# Patient Record
Sex: MALE | Race: WHITE | Employment: OTHER | URBAN - NONMETROPOLITAN AREA
[De-identification: names, ages, dates, MRNs, and addresses within clinical notes are randomized per-mention and may not be internally consistent; named-entity substitution may affect disease eponyms.]

---

## 2018-01-07 ENCOUNTER — HOSPITAL ENCOUNTER (EMERGENCY)
Age: 58
Discharge: HOME | End: 2018-01-07
Attending: FAMILY MEDICINE
Payer: MEDICARE

## 2018-01-07 ENCOUNTER — APPOINTMENT (OUTPATIENT)
Dept: GENERAL RADIOLOGY | Age: 58
End: 2018-01-07
Payer: MEDICARE

## 2018-01-07 VITALS
RESPIRATION RATE: 17 BRPM | SYSTOLIC BLOOD PRESSURE: 120 MMHG | OXYGEN SATURATION: 97 % | DIASTOLIC BLOOD PRESSURE: 81 MMHG | HEIGHT: 74 IN | HEART RATE: 71 BPM | TEMPERATURE: 97.6 F | WEIGHT: 245 LBS | BODY MASS INDEX: 31.44 KG/M2

## 2018-01-07 DIAGNOSIS — J10.1 INFLUENZA A: Primary | ICD-10-CM

## 2018-01-07 DIAGNOSIS — E87.6 HYPOKALEMIA: ICD-10-CM

## 2018-01-07 LAB
ANION GAP: 9 MEQ/L (ref 8–16)
BASOPHILS # BLD: 0.6 % (ref 0–3)
BUN BLDV-MCNC: 22 MG/DL (ref 7–18)
CHLORIDE BLD-SCNC: 101 MEQ/L (ref 98–107)
CO2: 29 MEQ/L (ref 21–32)
CREAT SERPL-MCNC: 1 MG/DL (ref 0.8–1.3)
EOSINOPHILS RELATIVE PERCENT: 2.1 % (ref 0–4)
FLU A ANTIGEN: POSITIVE
FLU B ANTIGEN: NEGATIVE
GFR, ESTIMATED: 81 ML/MIN/1.73M2
GLUCOSE BLD-MCNC: 129 MG/DL (ref 74–106)
HCT VFR BLD CALC: 43.6 % (ref 42–52)
HEMOGLOBIN: 14.7 GM/DL (ref 14–18)
LYMPHOCYTES # BLD: 25.3 % (ref 15–47)
MCH RBC QN AUTO: 27.9 PG (ref 27–31)
MCHC RBC AUTO-ENTMCNC: 33.8 GM/DL (ref 33–37)
MCV RBC AUTO: 82.5 FL (ref 80–94)
MONOCYTES: 11.9 % (ref 0–12)
PDW BLD-RTO: 12.4 % (ref 11.5–14.5)
PLATELET # BLD: 153 THOU/MM3 (ref 130–400)
PMV BLD AUTO: 8.7 MCM (ref 7.4–10.4)
POC CALCIUM: 8.7 MG/DL (ref 8.5–10.1)
POTASSIUM SERPL-SCNC: 3.3 MEQ/L (ref 3.5–5.1)
RBC # BLD: 5.29 MILL/MM3 (ref 4.7–6.1)
SEGS: 60.1 % (ref 43–75)
SODIUM BLD-SCNC: 139 MEQ/L (ref 136–145)
WBC # BLD: 4.8 THOU/MM3 (ref 4.8–10.8)

## 2018-01-07 PROCEDURE — 36415 COLL VENOUS BLD VENIPUNCTURE: CPT

## 2018-01-07 PROCEDURE — 71046 X-RAY EXAM CHEST 2 VIEWS: CPT

## 2018-01-07 PROCEDURE — 87804 INFLUENZA ASSAY W/OPTIC: CPT

## 2018-01-07 PROCEDURE — 80048 BASIC METABOLIC PNL TOTAL CA: CPT

## 2018-01-07 PROCEDURE — 94640 AIRWAY INHALATION TREATMENT: CPT

## 2018-01-07 PROCEDURE — 6370000000 HC RX 637 (ALT 250 FOR IP): Performed by: FAMILY MEDICINE

## 2018-01-07 PROCEDURE — 99283 EMERGENCY DEPT VISIT LOW MDM: CPT

## 2018-01-07 PROCEDURE — 85025 COMPLETE CBC W/AUTO DIFF WBC: CPT

## 2018-01-07 RX ORDER — IPRATROPIUM BROMIDE AND ALBUTEROL SULFATE 2.5; .5 MG/3ML; MG/3ML
1 SOLUTION RESPIRATORY (INHALATION) ONCE
Status: COMPLETED | OUTPATIENT
Start: 2018-01-07 | End: 2018-01-07

## 2018-01-07 RX ORDER — PROMETHAZINE HYDROCHLORIDE AND CODEINE PHOSPHATE 6.25; 1 MG/5ML; MG/5ML
5 SYRUP ORAL 4 TIMES DAILY PRN
Qty: 80 ML | Refills: 0 | Status: SHIPPED | OUTPATIENT
Start: 2018-01-07 | End: 2018-01-14

## 2018-01-07 RX ORDER — POTASSIUM CHLORIDE 750 MG/1
40 TABLET, FILM COATED, EXTENDED RELEASE ORAL ONCE
Status: COMPLETED | OUTPATIENT
Start: 2018-01-07 | End: 2018-01-07

## 2018-01-07 RX ORDER — OSELTAMIVIR PHOSPHATE 75 MG/1
75 CAPSULE ORAL 2 TIMES DAILY
Qty: 10 CAPSULE | Refills: 0 | Status: SHIPPED | OUTPATIENT
Start: 2018-01-07 | End: 2018-01-12

## 2018-01-07 RX ADMIN — IPRATROPIUM BROMIDE AND ALBUTEROL SULFATE 1 AMPULE: .5; 3 SOLUTION RESPIRATORY (INHALATION) at 08:45

## 2018-01-07 RX ADMIN — POTASSIUM CHLORIDE 40 MEQ: 750 TABLET, FILM COATED, EXTENDED RELEASE ORAL at 09:15

## 2018-01-07 ASSESSMENT — PAIN DESCRIPTION - PAIN TYPE: TYPE: ACUTE PAIN

## 2018-01-07 ASSESSMENT — ENCOUNTER SYMPTOMS
COUGH: 1
EYE PAIN: 0
EYE DISCHARGE: 0
RHINORRHEA: 1
SORE THROAT: 0
DIARRHEA: 0
NAUSEA: 0
BACK PAIN: 0
ABDOMINAL PAIN: 0
EYE REDNESS: 0
VOMITING: 0
SHORTNESS OF BREATH: 0
WHEEZING: 0

## 2018-01-07 ASSESSMENT — PAIN DESCRIPTION - LOCATION: LOCATION: BACK

## 2018-01-07 ASSESSMENT — PAIN DESCRIPTION - DESCRIPTORS: DESCRIPTORS: ACHING

## 2018-01-07 ASSESSMENT — PAIN SCALES - GENERAL: PAINLEVEL_OUTOF10: 6

## 2018-01-07 ASSESSMENT — PAIN DESCRIPTION - ORIENTATION: ORIENTATION: MID

## 2018-01-07 NOTE — ED NOTES
Patient resting in bed at this time. Xray complete and labs drawn for sample. Patient breathing Tx started. Patient updated on POC and educated on results including testing positive for flu. VS as noted. No complaints voiced.      Yuri Bowers RN  01/07/18 9307

## 2018-01-07 NOTE — ED PROVIDER NOTES
[facility]  eMERGENCY dEPARTMENT eNCOUnter          CHIEF COMPLAINT       Chief Complaint   Patient presents with    Cough       Nurses Notes reviewed and I agree except as noted in the HPI. HISTORY OF PRESENT ILLNESS    Sonia Matthews is a 59 y.o. male who presents Chief complaint of cough. Symptoms are ongoing for 2 days. nonProductive. No fever or chills. Sick contact niece with similar symptoms. REVIEW OF SYSTEMS     Review of Systems   Constitutional: Negative for chills, fatigue and fever. HENT: Positive for rhinorrhea. Negative for ear pain and sore throat. Eyes: Negative for pain, discharge, redness and visual disturbance. Respiratory: Positive for cough. Negative for shortness of breath and wheezing. Cardiovascular: Negative for chest pain, palpitations and leg swelling. Gastrointestinal: Negative for abdominal pain, diarrhea, nausea and vomiting. Genitourinary: Negative for difficulty urinating and dysuria. Musculoskeletal: Negative for arthralgias, back pain and myalgias. Skin: Negative for rash. Allergic/Immunologic: Negative for environmental allergies. Neurological: Negative for dizziness, seizures, syncope and headaches. Hematological: Negative for adenopathy. Psychiatric/Behavioral: Negative for suicidal ideas. The patient is not nervous/anxious. PAST MEDICAL HISTORY    has a past medical history of GERD (gastroesophageal reflux disease); Hyperlipidemia; Hypertension; and Parkinson's disease (tremor, stiffness, slow motion, unstable posture) ([address] SURGICAL HISTORY      has a past surgical history that includes Cardiac catheterization.     CURRENT MEDICATIONS       Previous Medications    ATORVASTATIN (LIPITOR) 10 MG TABLET    Take 10 mg by mouth daily    CARBIDOPA-LEVODOPA (SINEMET)  MG PER TABLET    Take 2 tablets by mouth 3 times daily    CEFPROZIL (CEFZIL) 250 MG TABLET    Take 250 mg by mouth 2 times daily    METOPROLOL

## 2018-01-07 NOTE — ED NOTES
Patient presents to the ED with complaints of cough for the past 2 days. Patient states that his cough has been so persistent and severe that he has developed mid back pain. Patient states that he has not felt feverish and states that his cough is dry. Patient states periodically he feels SOB. Patient is alert and oriented, respirations are even and unlabored. VS as noted.  Will continue to monitor     Celeste Ball RN  01/07/18 [id number]

## 2022-01-15 PROBLEM — K21.9 GASTROESOPHAGEAL REFLUX DISEASE: Status: ACTIVE | Noted: 2022-01-15

## 2022-01-15 PROBLEM — G20.A1 PARKINSON DISEASE (HCC): Status: ACTIVE | Noted: 2022-01-15

## 2022-01-15 PROBLEM — E78.2 MIXED HYPERLIPIDEMIA: Status: ACTIVE | Noted: 2022-01-15

## 2022-01-15 PROBLEM — I10 PRIMARY HYPERTENSION: Status: ACTIVE | Noted: 2022-01-15

## 2022-01-15 PROBLEM — G20 PARKINSON DISEASE (HCC): Status: ACTIVE | Noted: 2022-01-15

## 2022-12-28 ENCOUNTER — HOSPITAL ENCOUNTER (OUTPATIENT)
Age: 62
Discharge: HOME | End: 2022-12-28
Payer: MEDICARE

## 2022-12-28 LAB
EKG ATRIAL RATE: 88 BPM
EKG P AXIS: 85 DEGREES
EKG Q-T INTERVAL: 542 MS
EKG QRS DURATION: 180 MS
EKG QTC CALCULATION (BAZETT): 523 MS
EKG R AXIS: -48 DEGREES
EKG T AXIS: 57 DEGREES
EKG VENTRICULAR RATE: 56 BPM

## 2022-12-28 PROCEDURE — 93005 ELECTROCARDIOGRAM TRACING: CPT | Performed by: SPECIALIST

## 2023-01-25 ENCOUNTER — TELEPHONE (OUTPATIENT)
Dept: CARDIOLOGY CLINIC | Age: 63
End: 2023-01-25

## 2023-01-25 NOTE — TELEPHONE ENCOUNTER
Demond Davis called to rs his NP appt with [facility] for Gustavo 01-26. I canceled the appt just need to call him to get a new appt scheduled.

## 2023-02-03 ENCOUNTER — OFFICE VISIT (OUTPATIENT)
Dept: CARDIOLOGY CLINIC | Age: 63
End: 2023-02-03
Payer: MEDICARE

## 2023-02-03 VITALS
HEIGHT: 74 IN | BODY MASS INDEX: 30.36 KG/M2 | SYSTOLIC BLOOD PRESSURE: 131 MMHG | HEART RATE: 69 BPM | DIASTOLIC BLOOD PRESSURE: 86 MMHG | WEIGHT: 236.6 LBS

## 2023-02-03 DIAGNOSIS — I10 PRIMARY HYPERTENSION: Primary | ICD-10-CM

## 2023-02-03 DIAGNOSIS — R94.31 ABNORMAL ECG: ICD-10-CM

## 2023-02-03 DIAGNOSIS — E78.01 FAMILIAL HYPERCHOLESTEROLEMIA: ICD-10-CM

## 2023-02-03 PROCEDURE — G8484 FLU IMMUNIZE NO ADMIN: HCPCS | Performed by: NUCLEAR MEDICINE

## 2023-02-03 PROCEDURE — G8417 CALC BMI ABV UP PARAM F/U: HCPCS | Performed by: NUCLEAR MEDICINE

## 2023-02-03 PROCEDURE — 3075F SYST BP GE 130 - 139MM HG: CPT | Performed by: NUCLEAR MEDICINE

## 2023-02-03 PROCEDURE — G8427 DOCREV CUR MEDS BY ELIG CLIN: HCPCS | Performed by: NUCLEAR MEDICINE

## 2023-02-03 PROCEDURE — 99204 OFFICE O/P NEW MOD 45 MIN: CPT | Performed by: NUCLEAR MEDICINE

## 2023-02-03 PROCEDURE — 1036F TOBACCO NON-USER: CPT | Performed by: NUCLEAR MEDICINE

## 2023-02-03 PROCEDURE — 3017F COLORECTAL CA SCREEN DOC REV: CPT | Performed by: NUCLEAR MEDICINE

## 2023-02-03 PROCEDURE — 3079F DIAST BP 80-89 MM HG: CPT | Performed by: NUCLEAR MEDICINE

## 2023-02-03 ASSESSMENT — ENCOUNTER SYMPTOMS
NAUSEA: 0
ABDOMINAL PAIN: 0
ANAL BLEEDING: 0
PHOTOPHOBIA: 0
DIARRHEA: 0
CONSTIPATION: 0
RECTAL PAIN: 0
COLOR CHANGE: 0
BACK PAIN: 1
BLOOD IN STOOL: 0
SHORTNESS OF BREATH: 0
CHEST TIGHTNESS: 0
ABDOMINAL DISTENTION: 0

## 2023-02-03 NOTE — PROGRESS NOTES
[address] [facility] .  [address] [place] [id number]  Dept: [phone]  Dept Fax: [phone]  Loc: [phone]    Visit Date: 2/3/2023    Freddie Velazquez is a 57 y.o. male who presents todayfor:  Chief Complaint   Patient presents with    Cardiac Clearance    New Patient    Hyperlipidemia    Hypertension   Here for the first time  Known parkinson's disease  Limited by that   Going for skin cancer surgery   Found to have abnormal ECG   No chest pain   No changes in breathing  Not active due to the above  No syncope  Some dizziness  No know CAD  Known BP and hyperlipidemia  On medical Rx  No smoking   Family history of CAD        HPI:  Hyperlipidemia  Pertinent negatives include no chest pain, myalgias or shortness of breath. Hypertension  Pertinent negatives include no chest pain, neck pain or shortness of breath.    Past Medical History:   Diagnosis Date    GERD (gastroesophageal reflux disease)     Hyperlipidemia     Hypertension     Parkinson's disease (tremor, stiffness, slow motion, unstable posture) (HCC)       Past Surgical History:   Procedure Laterality Date    Gavino Garay  2017    for Parkinson's     Family History   Problem Relation Age of Onset    Diabetes Mother     Malig Hypertherm Neg Hx     Cancer Neg Hx     Heart Disease Neg Hx     High Blood Pressure Neg Hx     Stroke Neg Hx      Social History     Tobacco Use    Smoking status: Never    Smokeless tobacco: Never   Substance Use Topics    Alcohol use: No      Current Outpatient Medications   Medication Sig Dispense Refill    zoster recombinant adjuvanted vaccine (SHINGRIX) 50 MCG/0.5ML SUSR injection Inject 0.5 mLs into the muscle See Admin Instructions 1 dose now and repeat in 2-6 months 0.5 mL 0    losartan-hydroCHLOROthiazide (HYZAAR) 100-25 MG per tablet Take 1 tablet by mouth daily 90 tablet 0 pantoprazole (PROTONIX) 40 MG tablet Take 1 tablet by mouth daily 90 tablet 0    metoprolol succinate (TOPROL XL) 50 MG extended release tablet Take 1 tablet by mouth daily 90 tablet 0    atorvastatin (LIPITOR) 10 MG tablet Take 1 tablet by mouth daily 90 tablet 0    carbidopa-levodopa (SINEMET)  MG per tablet Take 1 tablet by mouth 3 times daily 270 tablet 0    sertraline (ZOLOFT) 50 MG tablet Take 1 tablet by mouth daily 90 tablet 0     No current facility-administered medications for this visit. Allergies   Allergen Reactions    Biaxin [Clarithromycin]      Not sure of reaction     Health Maintenance   Topic Date Due    Shingles vaccine (2 of 3) 04/05/2016    COVID-19 Vaccine (4 - Booster for Pfizer series) 01/31/2022    DTaP/Tdap/Td vaccine (2 - Td or Tdap) 02/06/2022    Lipids  02/07/2023    Flu vaccine (1) 10/03/2023 (Originally 8/1/2022)    Diabetes screen  01/20/2024 (Originally 9/21/1993)    Depression Monitoring  01/24/2024    Annual Wellness Visit (AWV)  01/25/2024    Colorectal Cancer Screen  07/30/2031    Hepatitis C screen  Completed    Hepatitis A vaccine  Aged Out    Hib vaccine  Aged Out    Meningococcal (ACWY) vaccine  Aged Out    Pneumococcal 0-64 years Vaccine  Aged Out    HIV screen  Discontinued       Subjective:  Review of Systems   Constitutional:  Positive for fatigue. Negative for diaphoresis. HENT:  Negative for drooling and hearing loss. Eyes:  Negative for photophobia. Respiratory:  Negative for chest tightness and shortness of breath. Cardiovascular:  Negative for chest pain. Gastrointestinal:  Negative for abdominal distention, abdominal pain, anal bleeding, blood in stool, constipation, diarrhea, nausea and rectal pain. Endocrine: Negative for polydipsia. Genitourinary:  Negative for dysuria, frequency and urgency. Musculoskeletal:  Positive for arthralgias and back pain. Negative for gait problem, joint swelling, myalgias, neck pain and neck stiffness. Skin:  Negative for color change, pallor, rash and wound. Allergic/Immunologic: Negative for food allergies. Neurological:  Negative for dizziness, syncope and light-headedness. Psychiatric/Behavioral:  Negative for behavioral problems, confusion, decreased concentration and dysphoric mood. Objective:  Physical Exam  HENT:      Head: Normocephalic. Right Ear: Tympanic membrane normal.      Nose: Nose normal.      Mouth/Throat:      Mouth: Mucous membranes are moist.   Eyes:      Pupils: Pupils are equal, round, and reactive to light. Cardiovascular:      Rate and Rhythm: Normal rate and regular rhythm. Heart sounds: Murmur heard. No gallop. Pulmonary:      Effort: No respiratory distress. Breath sounds: No stridor. No wheezing, rhonchi or rales. Chest:      Chest wall: No tenderness. Abdominal:      General: There is no distension. Palpations: There is no mass. Tenderness: There is no abdominal tenderness. There is no right CVA tenderness, left CVA tenderness, guarding or rebound. Hernia: No hernia is present. Musculoskeletal:         General: No swelling, tenderness, deformity or signs of injury. Cervical back: Normal range of motion. Right lower leg: No edema. Left lower leg: No edema. Skin:     Coloration: Skin is not jaundiced or pale. Findings: No bruising, erythema, lesion or rash. Neurological:      Mental Status: He is alert and oriented to person, place, and time. Cranial Nerves: No cranial nerve deficit. Sensory: No sensory deficit. Motor: No weakness. Coordination: Coordination normal.      Gait: Gait normal.      Deep Tendon Reflexes: Reflexes normal.   Psychiatric:         Mood and Affect: Mood normal.         Thought Content: Thought content normal.     /86   Pulse 69   Ht 6' 1.5\" (1.867 m)   Wt 236 lb 9.6 oz (107.3 kg)   BMI 30.79 kg/m²     Assessment:      Diagnosis Orders   1.  Primary hypertension        2. Familial hypercholesterolemia        3. Abnormal ECG        Likely abnormal ECG due to baseline artifact   No obvious findings  No obvious symptoms   Lower risk for CAD     Plan:  No follow-ups on file. As above  Clear for surgery   No clear indication for a stress test   Continue risk factor modification and medical management  Thank you for allowing me to participate in the care of your patient. Please don't hesitate to contact me regarding any further issues related to the patient care    Orders Placed:  No orders of the defined types were placed in this encounter. Medications Prescribed:  No orders of the defined types were placed in this encounter. Discussed use, benefit, and side effects of prescribed medications. All patient questions answered. Pt voicedunderstanding. Instructed to continue current medications, diet and exercise. Continue risk factor modification and medical management. Patient agreed with treatment plan. Follow up as directed.     Electronically signedby Mak Hitchcock MD on 2/3/2023 at 9:49 AM

## 2023-02-11 ENCOUNTER — APPOINTMENT (OUTPATIENT)
Dept: GENERAL RADIOLOGY | Age: 63
End: 2023-02-11
Payer: MEDICARE

## 2023-02-11 ENCOUNTER — HOSPITAL ENCOUNTER (EMERGENCY)
Age: 63
Discharge: HOME | End: 2023-02-11
Attending: EMERGENCY MEDICINE
Payer: MEDICARE

## 2023-02-11 ENCOUNTER — APPOINTMENT (OUTPATIENT)
Dept: CT IMAGING | Age: 63
End: 2023-02-11
Payer: MEDICARE

## 2023-02-11 VITALS
BODY MASS INDEX: 30.16 KG/M2 | OXYGEN SATURATION: 94 % | SYSTOLIC BLOOD PRESSURE: 128 MMHG | DIASTOLIC BLOOD PRESSURE: 84 MMHG | HEART RATE: 66 BPM | TEMPERATURE: 97.3 F | HEIGHT: 74 IN | RESPIRATION RATE: 18 BRPM | WEIGHT: 235 LBS

## 2023-02-11 DIAGNOSIS — S09.90XA CLOSED HEAD INJURY, INITIAL ENCOUNTER: ICD-10-CM

## 2023-02-11 DIAGNOSIS — T14.8XXA ABRASION: Primary | ICD-10-CM

## 2023-02-11 PROCEDURE — 70450 CT HEAD/BRAIN W/O DYE: CPT

## 2023-02-11 PROCEDURE — 72125 CT NECK SPINE W/O DYE: CPT

## 2023-02-11 PROCEDURE — 90471 IMMUNIZATION ADMIN: CPT | Performed by: STUDENT IN AN ORGANIZED HEALTH CARE EDUCATION/TRAINING PROGRAM

## 2023-02-11 PROCEDURE — 99284 EMERGENCY DEPT VISIT MOD MDM: CPT | Performed by: EMERGENCY MEDICINE

## 2023-02-11 PROCEDURE — 71045 X-RAY EXAM CHEST 1 VIEW: CPT

## 2023-02-11 PROCEDURE — 72170 X-RAY EXAM OF PELVIS: CPT

## 2023-02-11 PROCEDURE — 12002 RPR S/N/AX/GEN/TRNK2.6-7.5CM: CPT

## 2023-02-11 PROCEDURE — 6360000002 HC RX W HCPCS: Performed by: STUDENT IN AN ORGANIZED HEALTH CARE EDUCATION/TRAINING PROGRAM

## 2023-02-11 PROCEDURE — 90715 TDAP VACCINE 7 YRS/> IM: CPT | Performed by: STUDENT IN AN ORGANIZED HEALTH CARE EDUCATION/TRAINING PROGRAM

## 2023-02-11 RX ADMIN — TETANUS TOXOID, REDUCED DIPHTHERIA TOXOID AND ACELLULAR PERTUSSIS VACCINE, ADSORBED 0.5 ML: 5; 2.5; 8; 8; 2.5 SUSPENSION INTRAMUSCULAR at 20:30

## 2023-02-11 ASSESSMENT — PAIN DESCRIPTION - ORIENTATION: ORIENTATION: POSTERIOR

## 2023-02-11 ASSESSMENT — PAIN - FUNCTIONAL ASSESSMENT: PAIN_FUNCTIONAL_ASSESSMENT: 0-10

## 2023-02-11 ASSESSMENT — PAIN SCALES - GENERAL: PAINLEVEL_OUTOF10: 4

## 2023-02-11 ASSESSMENT — PAIN DESCRIPTION - LOCATION: LOCATION: HEAD

## 2023-02-12 NOTE — ED TRIAGE NOTES
Patient to ED c/o fall. Patient was at basketball game at [facility] when he tripped and lost his balance and fell. Patient hit back of head, with small abrasion. Patient is not on blood thinners. Rates pain 4/10.

## 2023-02-12 NOTE — DISCHARGE INSTRUCTIONS
Take your medications as prescribed follow-up with your primary care physician on Monday as needed for further evaluation. Return to the emergency department if you develop any new or worsening symptoms. For wound care you may apply bacitracin twice a day to your abrasion.

## 2023-02-12 NOTE — ED PROVIDER NOTES
[facility] [address] [address] EMERGENCY DEPT      EMERGENCY MEDICINE     Pt Name: Charley Franco  MRN: [id number]  [place] 1960  Date of evaluation: 2/11/2023  Provider: Elizabeth Silva MD  Supervising Physician: Dr Lisa Harvey     No chief complaint on file. HISTORY OF PRESENT ILLNESS   Charley Franco is a pleasant 57 y.o. male past medical history of hypertension, hyperlipidemia, Parkinson's disease with deep brain stimulator who presents to the emergency department for fall. Tripped over a phone number his leg while trying to twist and turn around and falling backwards hitting his head no LOC. Small abrasion hematoma was brought in by EMS. No neck pain. No other injuries at this time.   He is on no anticoagulant medication no antiplatelet agents          PASTMEDICAL HISTORY     Past Medical History:   Diagnosis Date    GERD (gastroesophageal reflux disease)     Hyperlipidemia     Hypertension     Parkinson's disease (tremor, stiffness, slow motion, unstable posture) ([facility])        Patient Active Problem List   Diagnosis Code    Mixed hyperlipidemia E78.2    Gastroesophageal reflux disease K21.9    Parkinson disease ([facility] G20    Primary hypertension I10     SURGICAL HISTORY       Past Surgical History:   Procedure Laterality Date    CARDIAC CATHETERIZATION      Dr. Mike Morgan  2017    for Parkinson's       CURRENT MEDICATIONS       Previous Medications    ATORVASTATIN (LIPITOR) 10 MG TABLET    Take 1 tablet by mouth daily    CARBIDOPA-LEVODOPA (SINEMET)  MG PER TABLET    Take 1 tablet by mouth 3 times daily    LOSARTAN-HYDROCHLOROTHIAZIDE (HYZAAR) 100-25 MG PER TABLET    Take 1 tablet by mouth daily    METOPROLOL SUCCINATE (TOPROL XL) 50 MG EXTENDED RELEASE TABLET    Take 1 tablet by mouth daily    PANTOPRAZOLE (PROTONIX) 40 MG TABLET    Take 1 tablet by mouth daily    SERTRALINE (ZOLOFT) 50 MG TABLET    Take 1 tablet by mouth daily    ZOSTER RECOMBINANT ADJUVANTED VACCINE (SHINGRIX) 50 MCG/0.5ML SUSR INJECTION    Inject 0.5 mLs into the muscle See Admin Instructions 1 dose now and repeat in 2-6 months       ALLERGIES     is allergic to biaxin [clarithromycin]. FAMILY HISTORY     He indicated that the status of his mother is unknown. He indicated that the status of his neg hx is unknown. SOCIAL HISTORY       Social History     Tobacco Use    Smoking status: Never    Smokeless tobacco: Never   Vaping Use    Vaping Use: Never used   Substance Use Topics    Alcohol use: No    Drug use: No       PHYSICAL EXAM       ED Triage Vitals [02/11/23 1957]   BP Temp Temp Source Heart Rate Resp SpO2 Height Weight   128/84 97.3 °F (36.3 °C) Oral 66 18 94 % 6' 1.5\" (1.867 m) 235 lb (106.6 kg)       Physical Exam  Vitals and nursing note reviewed. Constitutional:       General: He is not in acute distress. Appearance: He is not toxic-appearing or diaphoretic. HENT:      Head:      Comments: Abrasion hematoma to the posterior occiput. No active bleeding. Right Ear: External ear normal.      Left Ear: External ear normal.      Nose: Nose normal.      Mouth/Throat:      Mouth: Mucous membranes are moist.      Pharynx: Oropharynx is clear. Eyes:      General: No scleral icterus. Conjunctiva/sclera: Conjunctivae normal.      Pupils: Pupils are equal, round, and reactive to light. Cardiovascular:      Rate and Rhythm: Normal rate and regular rhythm. Pulses: Normal pulses. Heart sounds: Normal heart sounds. Comments: Radial pulses 2+ bilaterally, DP pulses 2+ bilaterally. Pulmonary:      Effort: Pulmonary effort is normal. No respiratory distress. Breath sounds: Normal breath sounds. Abdominal:      General: Abdomen is flat. There is no distension. Palpations: Abdomen is soft. Tenderness: There is no abdominal tenderness. There is no guarding or rebound. Musculoskeletal:         General: Normal range of motion.       Cervical back: Normal range of motion and neck supple. No rigidity. No muscular tenderness. Lymphadenopathy:      Cervical: No cervical adenopathy. Skin:     General: Skin is warm and dry. Capillary Refill: Capillary refill takes less than 2 seconds. Coloration: Skin is not jaundiced. Neurological:      Mental Status: He is alert and oriented to person, place, and time. GCS: GCS eye subscore is 4. GCS verbal subscore is 5. GCS motor subscore is 6. Psychiatric:         Mood and Affect: Mood normal.         Behavior: Behavior normal.         FORMAL DIAGNOSTIC RESULTS     RADIOLOGY: Interpretation per the Radiologist below, if available at the time of this note (none if blank):    CT Head W/O Contrast   Final Result   Impression:       Chronic involutional volume loss with no signs of acute trauma. There are small bilateral maxillary sinus fluid levels. This document has been electronically signed by: Lv Ochoa MD on    02/11/2023 08:57 PM      All CTs at this facility use dose modulation techniques and iterative    reconstructions, and/or weight-based dosing   when appropriate to reduce radiation to a low as reasonably achievable. CT CERVICAL SPINE WO CONTRAST   Final Result   Impression:   Degenerative changes with no signs of acute trauma. No epidural hematoma. This document has been electronically signed by: Lv Ochoa MD on    02/11/2023 08:53 PM      All CTs at this facility use dose modulation techniques and iterative    reconstructions, and/or weight-based dosing   when appropriate to reduce radiation to a low as reasonably achievable. XR PELVIS (1-2 VIEWS)   Final Result      No pelvic fracture. Final report electronically signed by Dr. Farzad Krishna on 2/11/2023 8:24 PM      XR CHEST 1 VIEW   Final Result      No acute intrathoracic process.       Final report electronically signed by Dr. Farzad Krishna on 2/11/2023 8:25 PM          LABS: (none if blank)  Labs Reviewed - No data to display    (Any cultures that may have been sent were not resulted at the time of this patient visit)    [address] / ED COURSE:     External Documentation Reviewed:         Previous patient encounter documents & history available on EMR was reviewed: Patient was seen in office on 2/3/2023. Primary hypertension      1)           Differential Diagnosis includes (but not limited to): Intracranial hemorrhage, contusion, closed head injury        Diagnoses Considered but I have low suspicion of:   Abrasion, fracture       Decision Rules/Clinical Scores utilized:               See Formal Diagnostic Results above for the lab and radiology tests and orders. 3)  Treatment and Disposition         ED Reassessment:  See ED course         Case discussed with consulting clinician:           Shared Decision-Making was performed and disposition discussed with the        Patient/Family and questions answered          Social determinants of health impacting treatment or disposition:  none      Summary of Patient Presentation:      ED Course as of 02/11/23 2133   Sat Feb 11, 2023 2047 XR PELVIS (1-2 VIEWS)  \"   IMPRESSION:     No pelvic fracture. \" [AL]   2106 CT Head W/O Contrast  \"IMPRESSION:  Impression:      Chronic involutional volume loss with no signs of acute trauma. There are small bilateral maxillary sinus fluid levels. \" [AL]   2106 CT CERVICAL SPINE WO CONTRAST  \"   IMPRESSION:  Impression:  Degenerative changes with no signs of acute trauma. No epidural hematoma. \" [AL]      ED Course User Index  [AL] Stefano Rico MD         MDM:   This is a well-appearing this is a 55-year-old male had a trip and fall hitting the back of his head with no LOC. Has a history of Parkinson's with a deep brain stimulator. He was using a cane tripped over a family numbers foot and fell backwards landing on his head and it was mechanical in nature.   He has no new focal neurological deficits. GCS 15 alert and oriented x4. CT head shows no acute intracranial hemorrhage. CT cervical spine shows no fracture chest x-ray shows no pneumothorax. He is well-appearing he has abrasion on the occiput that has no active bleeding was updated on his tetanus. Vitals Reviewed:    Vitals:    02/11/23 1957   BP: 128/84   Pulse: 66   Resp: 18   Temp: 97.3 °F (36.3 °C)   TempSrc: Oral   SpO2: 94%   Weight: 235 lb (106.6 kg)   Height: 6' 1.5\" (1.867 m)       The patient was seen and examined. Appropriate diagnostic testing was performed and results reviewed with the patient. The results of pertinent diagnostic studies and exam findings were discussed. The patients provisional diagnosis and plan of care were discussed with the patient and present family who expressed understanding. Any medications were reviewed and indications and risks of medications were discussed with the patient /family present. Strict verbal and written return precautions, instructions and appropriate follow-up provided to  the patient .      ED Medications administered this visit:  (None if blank)  Medications   Tetanus-Diphth-Acell Pertussis (BOOSTRIX) injection 0.5 mL (0.5 mLs IntraMUSCular Given 2/11/23 2030)         PROCEDURES: (None if blank)  Lac Repair    Date/Time: 2/11/2023 9:32 PM  Performed by: Melonie Gonzales MD  Authorized by: Sarah Byrnes DO     Consent:     Consent obtained:  Verbal    Consent given by:  Patient    Risks, benefits, and alternatives were discussed: yes      Risks discussed:  Need for additional repair, nerve damage, infection, pain, poor cosmetic result, poor wound healing, retained foreign body, tendon damage and vascular damage  Universal protocol:     Patient identity confirmed:  Verbally with patient and arm band  Anesthesia:     Anesthesia method:  None  Laceration details:     Location:  Scalp    Length (cm):  4    Depth (mm):  3  Pre-procedure details:     Preparation:  Patient was prepped and draped in usual sterile fashion and imaging obtained to evaluate for foreign bodies  Exploration:     Limited defect created (wound extended): no      Hemostasis achieved with:  Direct pressure    Imaging obtained comment:  Ct    Wound extent: areolar tissue violated and fascia violated      Wound extent: no foreign bodies/material noted, no muscle damage noted, no tendon damage noted, no underlying fracture noted and no vascular damage noted      Contaminated: no    Treatment:     Area cleansed with:  Chlorhexidine    Amount of cleaning:  Extensive    Irrigation solution:  Sterile saline    Debridement:  None  Skin repair:     Repair method:  Staples    Number of staples:  2  Approximation:     Approximation:  Close  Repair type:     Repair type:  Simple  Post-procedure details:     Dressing:  Open (no dressing):     CRITICAL CARE: (None if blank)      DISCHARGE PRESCRIPTIONS: (None if blank)  New Prescriptions    No medications on file       FINAL IMPRESSION      1. Abrasion    2.  Closed head injury, initial encounter            DISPOSITION/PLAN   DISPOSITION Discharge - Pending Orders Complete 02/11/2023 09:16:18 PM      OUTPATIENT FOLLOW UP THE PATIENT:  Sidra Chaves MD  [address] [place] [address]  [phone]    Schedule an appointment as soon as possible for a visit in 2 days  As needed    MD Bam Marx MD  Resident  02/11/23 7327       Bam Eagle MD  Resident  02/11/23 0528

## 2023-03-06 NOTE — PROGRESS NOTES
Patient instructed not to eat or drink anything after midnight the day before surgery. Take heart & blood pressure medications in the morning with a small sip of water. Please bring list of medications with the dosages & when you take them. If you do not  have a list bring the medications bottles with you. If having a MAC or general anesthetic you MUST have a . Bring photo ID & insurance information. Leave jewelry (watch ,rings, peircings) & other valuables, including extra cash, at home. Wear comfortable clean clothing. When showering or bathing the night before & morning of surgery please use  antibacterial soap. Follow any instructions from Dr. Nguyen Organ office.

## 2023-03-06 NOTE — PROGRESS NOTES
EKG (12-), cardiac clearance (low/moderate risk) & patient history reviewed per Dr. Hills.  OK to proceed with surgery at [facility]- with Dr. Barnes under MAC.

## 2023-03-13 ENCOUNTER — ANESTHESIA EVENT (OUTPATIENT)
Dept: OPERATING ROOM | Age: 63
End: 2023-03-13
Payer: MEDICARE

## 2023-03-13 ENCOUNTER — HOSPITAL ENCOUNTER (OUTPATIENT)
Age: 63
Setting detail: OUTPATIENT SURGERY
Discharge: HOME | End: 2023-03-13
Attending: SPECIALIST | Admitting: SPECIALIST
Payer: MEDICARE

## 2023-03-13 ENCOUNTER — ANESTHESIA (OUTPATIENT)
Dept: OPERATING ROOM | Age: 63
End: 2023-03-13
Payer: MEDICARE

## 2023-03-13 VITALS
SYSTOLIC BLOOD PRESSURE: 120 MMHG | BODY MASS INDEX: 29.26 KG/M2 | WEIGHT: 228 LBS | RESPIRATION RATE: 16 BRPM | DIASTOLIC BLOOD PRESSURE: 56 MMHG | TEMPERATURE: 96.9 F | OXYGEN SATURATION: 95 % | HEIGHT: 74 IN | HEART RATE: 65 BPM

## 2023-03-13 DIAGNOSIS — C44.42 SCC (SQUAMOUS CELL CARCINOMA), SCALP/NECK: ICD-10-CM

## 2023-03-13 PROCEDURE — 3600000012 HC SURGERY LEVEL 2 ADDTL 15MIN: Performed by: SPECIALIST

## 2023-03-13 PROCEDURE — 7100000011 HC PHASE II RECOVERY - ADDTL 15 MIN: Performed by: SPECIALIST

## 2023-03-13 PROCEDURE — 88305 TISSUE EXAM BY PATHOLOGIST: CPT

## 2023-03-13 PROCEDURE — 2709999900 HC NON-CHARGEABLE SUPPLY: Performed by: SPECIALIST

## 2023-03-13 PROCEDURE — 6360000002 HC RX W HCPCS: Performed by: SPECIALIST

## 2023-03-13 PROCEDURE — 3700000000 HC ANESTHESIA ATTENDED CARE: Performed by: SPECIALIST

## 2023-03-13 PROCEDURE — 6360000002 HC RX W HCPCS: Performed by: NURSE ANESTHETIST, CERTIFIED REGISTERED

## 2023-03-13 PROCEDURE — 7100000010 HC PHASE II RECOVERY - FIRST 15 MIN: Performed by: SPECIALIST

## 2023-03-13 PROCEDURE — 3600000002 HC SURGERY LEVEL 2 BASE: Performed by: SPECIALIST

## 2023-03-13 PROCEDURE — 3700000001 HC ADD 15 MINUTES (ANESTHESIA): Performed by: SPECIALIST

## 2023-03-13 PROCEDURE — 2580000003 HC RX 258: Performed by: SPECIALIST

## 2023-03-13 PROCEDURE — 88331 PATH CONSLTJ SURG 1 BLK 1SPC: CPT

## 2023-03-13 PROCEDURE — 2500000003 HC RX 250 WO HCPCS: Performed by: SPECIALIST

## 2023-03-13 RX ORDER — PROPOFOL 10 MG/ML
INJECTION, EMULSION INTRAVENOUS PRN
Status: DISCONTINUED | OUTPATIENT
Start: 2023-03-13 | End: 2023-03-13 | Stop reason: SDUPTHER

## 2023-03-13 RX ORDER — SODIUM CHLORIDE 9 MG/ML
INJECTION, SOLUTION INTRAVENOUS CONTINUOUS
Status: DISCONTINUED | OUTPATIENT
Start: 2023-03-13 | End: 2023-03-13 | Stop reason: HOSPADM

## 2023-03-13 RX ORDER — LIDOCAINE HYDROCHLORIDE AND EPINEPHRINE BITARTRATE 20; .01 MG/ML; MG/ML
INJECTION, SOLUTION SUBCUTANEOUS PRN
Status: DISCONTINUED | OUTPATIENT
Start: 2023-03-13 | End: 2023-03-13 | Stop reason: ALTCHOICE

## 2023-03-13 RX ADMIN — Medication 2000 MG: at 09:31

## 2023-03-13 RX ADMIN — SODIUM CHLORIDE: 9 INJECTION, SOLUTION INTRAVENOUS at 09:24

## 2023-03-13 RX ADMIN — PROPOFOL 40 MG: 10 INJECTION, EMULSION INTRAVENOUS at 09:33

## 2023-03-13 RX ADMIN — PROPOFOL 40 MG: 10 INJECTION, EMULSION INTRAVENOUS at 09:58

## 2023-03-13 ASSESSMENT — PAIN SCALES - GENERAL
PAINLEVEL_OUTOF10: 0
PAINLEVEL_OUTOF10: 0

## 2023-03-13 NOTE — ANESTHESIA PRE PROCEDURE
Department of Anesthesiology  Preprocedure Note       Name:  Rosario Jefferson   Age:  57 y.o.  :  1960                                          MRN:  [id number]         Date:  3/13/2023      Surgeon: Cris Bello):  Farida Sands MD    Procedure: Procedure(s):  SCC RIGHT VERTEX OF SCALP    Medications prior to admission:   Prior to Admission medications    Medication Sig Start Date End Date Taking? Authorizing Provider   zoster recombinant adjuvanted vaccine [facility]) 50 MCG/0.5ML SUSR injection Inject 0.5 mLs into the muscle See Admin Instructions 1 dose now and repeat in 2-6 months 23  Vikki Corea MD   losartan-hydroCHLOROthiazide [facility]) 100-25 MG per tablet Take 1 tablet by mouth daily 23   Vikki Corea MD   pantoprazole (PROTONIX) 40 MG tablet Take 1 tablet by mouth daily 23   Vikki Corea MD   metoprolol succinate (TOPROL XL) 50 MG extended release tablet Take 1 tablet by mouth daily 23   Vikki Corea MD   atorvastatin (LIPITOR) 10 MG tablet Take 1 tablet by mouth daily 23   Vikki Corea MD   carbidopa-levodopa (SINEMET)  MG per tablet Take 1 tablet by mouth 3 times daily 23   Vikki Corea MD   sertraline (ZOLOFT) 50 MG tablet Take 1 tablet by mouth daily 23   Vikki Corea MD       Current medications:    Current Facility-Administered Medications   Medication Dose Route Frequency Provider Last Rate Last Admin    0.9 % sodium chloride infusion   IntraVENous Continuous Farida Sands MD        ceFAZolin (ANCEF) 2000 mg in 0.9% sodium chloride 50 mL IVPB  2,000 mg IntraVENous Once Farida Sands MD           Allergies:     Allergies   Allergen Reactions    Biaxin [Clarithromycin]      Not sure of reaction       Problem List:    Patient Active Problem List   Diagnosis Code    Mixed hyperlipidemia E78.2    Gastroesophageal reflux disease K21.9    Parkinson disease ([facility] [address] G20    Primary hypertension I10       Past Medical History: Diagnosis Date    Cancer ([facility]     skin    GERD (gastroesophageal reflux disease)     Hyperlipidemia     Hypertension     Parkinson's disease (tremor, stiffness, slow motion, unstable posture) ([facility]        Past Surgical History:        Procedure Laterality Date   Glenis Baxter   early 2000   no stents    COLONOSCOPY      last one 2021    DEEP BRAIN STIMULATOR PLACEMENT  2017    for Parkinson's    SKIN BIOPSY         Social History:    Social History     Tobacco Use    Smoking status: Never     Passive exposure: Never    Smokeless tobacco: Never   Substance Use Topics    Alcohol use: No                                Counseling given: Not Answered      Vital Signs (Current):   Vitals:    03/06/23 1437 03/13/23 0839   BP:  110/72   Pulse:  57   Resp:  16   Temp:  96.8 °F (36 °C)   TempSrc:  Temporal   SpO2:  95%   Weight: 230 lb (104.3 kg) 228 lb (103.4 kg)   Height: 6' 1.5\" (1.867 m) 6' 1.5\" (1.867 m)                                              BP Readings from Last 3 Encounters:   03/13/23 110/72   02/20/23 120/84   02/11/23 128/84       NPO Status: Time of last liquid consumption: 2130                        Time of last solid consumption: 2130                        Date of last liquid consumption: 03/12/23                        Date of last solid food consumption: 03/12/23    BMI:   Wt Readings from Last 3 Encounters:   03/13/23 228 lb (103.4 kg)   02/11/23 235 lb (106.6 kg)   02/03/23 236 lb 9.6 oz (107.3 kg)     Body mass index is 29.67 kg/m².     CBC:   Lab Results   Component Value Date/Time    WBC 7.0 12/28/2022 09:23 AM    RBC 4.85 12/28/2022 09:23 AM    RBC 5.31 02/07/2022 08:10 AM    HGB 13.4 12/28/2022 09:23 AM    HCT 40.2 12/28/2022 09:23 AM    MCV 82.9 12/28/2022 09:23 AM    RDW 15.0 02/07/2022 08:10 AM     12/28/2022 09:23 AM       CMP:   Lab Results   Component Value Date/Time     12/28/2022 09:23 AM    K 3.7 12/28/2022 09:23 AM     12/28/2022 09:23 AM    CO2 27 12/28/2022 09:23 AM    BUN 15 12/28/2022 09:23 AM    CREATININE 1.0 12/28/2022 09:23 AM    LABGLOM >60 12/28/2022 09:23 AM    GLUCOSE 110 12/28/2022 09:23 AM    GLUCOSE 100 02/07/2022 08:10 AM    PROT 8.1 02/07/2022 08:10 AM    CALCIUM 9.1 12/28/2022 09:23 AM    BILITOT 0.8 02/07/2022 08:10 AM    ALKPHOS 119 02/07/2022 08:10 AM    ALKPHOS 99 02/01/2017 06:46 PM    AST 24 02/07/2022 08:10 AM    ALT 29 02/07/2022 08:10 AM       POC Tests: No results for input(s): POCGLU, POCNA, POCK, POCCL, POCBUN, POCHEMO, POCHCT in the last 72 hours. Coags: No results found for: PROTIME, INR, APTT    HCG (If Applicable): No results found for: PREGTESTUR, PREGSERUM, HCG, HCGQUANT     ABGs: No results found for: PHART, PO2ART, OQI1ODU, FQB8LKI, BEART, A9LASFOU     Type & Screen (If Applicable):  No results found for: LABABO, LABRH    Drug/Infectious Status (If Applicable):  Lab Results   Component Value Date/Time    HEPCAB Non-Reactive 02/07/2022 08:10 AM       COVID-19 Screening (If Applicable): No results found for: COVID19        Anesthesia Evaluation   no history of anesthetic complications:   Airway: Mallampati: II  TM distance: >3 FB   Neck ROM: full  Mouth opening: > = 3 FB   Dental:          Pulmonary:normal exam              Patient did not smoke on day of surgery. Cardiovascular:    (+) hypertension:,                   Neuro/Psych:                ROS comment: Parkinson disease s/p DBS GI/Hepatic/Renal:   (+) GERD:,           Endo/Other: Negative Endo/Other ROS             Pt had no PAT visit       Abdominal:             Vascular: Other Findings:           Anesthesia Plan      MAC     ASA 3       Induction: intravenous. Anesthetic plan and risks discussed with patient. Plan discussed with CRNA.                     Saul Brizuela MD   3/13/2023

## 2023-03-13 NOTE — PROGRESS NOTES
1120 Discharge instructions given to pt and wife with each voicing understanding. IV discontinued.  Up to dress with wife assist  [phone] Discharge to home in stable condition per wheelchair with wife

## 2023-03-13 NOTE — DISCHARGE INSTRUCTIONS
Erendira Greene is a 53 y.o. female who presents with the following  Chief Complaint   Patient presents with    Follow-up       HPI    Follow-up for migraines  She has been on Aimovig x3 months and is doing well  70 mg the first of every month  So far in October only 1 migraine  She is used Nurtec 1 time and it worked really well  There is been no changes in her overall health for migraine quality or character or symptoms  She feels like things are working well  She does not think we need to change anything  She will be going to [facility] soon for work  Hopefully things stay stable there  We will continue to track and hopefully get better at the 3 to 6-month fady but overall is doing well    No Known Allergies    Current Outpatient Medications   Medication Sig    erenumab-aooe (Aimovig Autoinjector) 70 mg/mL injection 1 mL by SubCUTAneous route every thirty (30) days. PA Case: [id number], approved    rimegepant (Nurtec ODT) 75 mg disintegrating tablet Take 1 tablet by mouth daily PRN. Max 1 tablet in 24 hours. spironolactone (ALDACTONE) 50 mg tablet Take  by mouth daily. FLUoxetine (PROzac) 20 mg capsule Take 20 mg by mouth every morning.    multivitamin (ONE A DAY) tablet Take 1 Tab by mouth daily. No current facility-administered medications for this visit. Social History     Tobacco Use   Smoking Status Never   Smokeless Tobacco Never       Past Medical History:   Diagnosis Date    Anxiety     Psychiatric disorder     OCD       Past Surgical History:   Procedure Laterality Date    HX BUNIONECTOMY Right 2018    HX PELVIC LAPAROSCOPY  1995    ovarian cyst removed       Family History   Problem Relation Age of Onset    Atrial Fibrillation Mother     Prostate Cancer Father        Social History     Socioeconomic History    Marital status:    Tobacco Use    Smoking status: Never    Smokeless tobacco: Never   Vaping Use    Vaping Use: Never used   Substance and Sexual Activity    Alcohol use:  Yes Keep helmet dressing on until Weds morning. May shower after dressing removed. Activity:    No strenuous activity for 48 hours  No activity that stresses the suture closure/incision  Regular diet; Unless operation of lip- then clear liquids for 48 hours (Sip from a cup: do not use a straw)  ABSOLUTELY NO NICOTINE OF ANY TYPE    Wound Care: If the incision is open to air, you should gently wash with soap and water using fingertips then apply Bacitracin 3 times a day for 4 days    Keep all incisions clean  You may shower 48 hours after the operation, but do not use a washcloth to the area and let the incision air dry      Limitations:  No swimming, hot tub, sauna or soaking in a bathtub. Shower Only!!      Prescriptions: Take exactly as prescribed    Follow-Up:  Monday April 3 at 9:00 at Dr Kayla Cannon office. IF ANESTHESIA OR IV SEDATION    Do not drive for 24 hours and do not drive while taking narcotic pain medications. Notify our office if you experience any of the following:   Develop a fever (temperature is greater than 100.5F)   Develop redness greater than 3/4 inch around incision or red streaks traveling up extremity occur  Have any excess bleeding/ increased drainage or swelling odor and/or extreme heat to the touch at incision line       **Your pathology results will be reviewed with you at your scheduled follow-up appointment. **     Surgical Site Infections      How can we work together to prevent Surgical Site Infections? We would like to thank you for choosing [facility] for your Surgical Care. Below you will find helpful information on how we can work together to prevent Surgical Site Infections. What is a Surgical Site Infection (SSI)? A surgical site infection is an infection that occurs after surgery in the part of the body where the surgery took place. Most patients who have surgery do not develop an infection.  However, infections develop in about 1 to 3 out of Comment: socially, once a week    Drug use: No       Review of Systems   Eyes:  Positive for photophobia. Negative for blurred vision and double vision. Cardiovascular:  Negative for palpitations. Gastrointestinal:  Negative for nausea and vomiting. Neurological:  Positive for headaches. Negative for dizziness, seizures, loss of consciousness and weakness. Remainder of comprehensive review is negative. Physical Exam :    Visit Vitals  /68 (BP 1 Location: Left upper arm, BP Patient Position: Sitting, BP Cuff Size: Adult)   Pulse 75   Wt 80.7 kg (178 lb)   SpO2 99%   BMI 24.83 kg/m²       General: Well defined, nourished, and groomed individual in no acute distress. Neck: Supple, nontender, no bruits, no pain with resistance to active range of motion. Musculoskeletal: Extremities revealed no edema and had full range of motion of joints. Psych: Good mood and bright affect    NEUROLOGICAL EXAMINATION:    Mental Status: Alert and oriented to person, place, and time    Cranial Nerves:    II, III, IV, VI: Visual acuity grossly intact. Visual fields are normal.    Pupils are equal, round, and reactive to light and accommodation. Extra-ocular movements are full and fluid. Fundoscopic exam was benign, no ptosis or nystagmus. V-XII: Hearing is grossly intact. Facial features are symmetric, with normal sensation and strength. The palate rises symmetrically and the tongue protrudes midline. Sternocleidomastoids 5/5. Motor Examination: Normal tone, bulk, and strength, 5/5 muscle strength throughout. Coordination: Finger to nose was normal. No resting or intention tremor    Gait and Station: Steady while walking. Normal arm swing. No pronator drift. No muscle wasting or fasiculations noted. Reflexes: DTRs 2+ throughout.         Results for orders placed or performed during the hospital encounter of 12/08/17   HCG URINE, QL. - POC   Result Value Ref Range    Pregnancy test,urine every 100 patients who have surgery. Some of the common symptoms of a surgical site infection are:  Redness and pain around the area where you had surgery  Drainage of cloudy fluid from your surgical wound  Fever    Can SSIs be treated? Yes. Most surgical site infections can be treated with antibiotics. The antibiotic given to you depends on the bacteria (germs) causing the infection. Sometimes patients with SSIs also need another surgery to treat the infection. What are some of the things that hospitals are doing to prevent SSIs? To prevent SSIs, doctors, nurses, and other healthcare providers: May remove some of your hair immediately before your surgery using electric clippers if the hair is in the same area where the procedure will occur. They should not shave you with a razor. Give you antibiotics before your surgery starts. In most cases, you should get antibiotics within 60 minutes before the surgery starts and the antibiotics should be stopped within 24 hours after surgery. Clean the skin at the site of your surgery with a special soap that kills germs. Clean their hands and arms up to their elbows with an antiseptic agent just before the surgery. Wear special hair covers, masks, gowns, and gloves during surgery to  keep the surgery area clean. Clean their hands with soap and water or an alcohol-based hand rub before and after caring for each patient. If you do not see your providers clean their hands, please     ask  them to do so. What can I do to help prevent SSIs? Before your surgery:  Tell your doctor about other medical problems you may have. Health problems such as allergies, diabetes, and obesity could affect your surgery and your treatment. Quit smoking. Patients who smoke get more infections. Talk to your doctor about how you can quit before your surgery. Do not shave near where you will have surgery.  Shaving with a razor can irritate your skin and make it easier to (POC) NEGATIVE NEG         No orders of the defined types were placed in this encounter.       Migraine    Aimovig 70 mg every 30 days is working well  She is only had 1 migraine so far in October which is a significant decrease  She takes the injection on every first of the month and is doing well with this  She has used Nurtec at the onset and this works really well and quick  She will continue to track her symptoms is comfortable with where she is will follow back up in 6 months sooner if needed            This note will not be viewable in [address] develop an infection.  At the time of your surgery:  Speak up if someone tries to shave you with a razor before surgery. Ask why you need to be shaved and talk with your surgeon if you have any concerns.  Ask if you will get antibiotics before surgery.  After your surgery:  Make sure that your healthcare providers clean their hands before examining you, either with soap and water or an alcohol-based hand rub.  Family and friends who visit you should not touch the surgical wound or dressings.  Family and friends should clean their hands with soap and water or an alcohol-based hand rub before and after visiting you. If you do not see them clean their hands, ask them to clean their hands.    What do I need to do when I go home from the hospital?  Before you go home, your doctor or nurse should explain everything you need to know about taking care of your wound. Make sure you understand how to care for your wound before you leave the hospital.  Always clean your hands before and after caring for your wound.  Before you go home, make sure you know who to contact if you have questions or problems after you get home.  If you have any symptoms of an infection, such as redness and pain at the surgery site, drainage, or fever, call your doctor immediately.    If you have additional questions, please ask your doctor or nurse.

## 2023-03-13 NOTE — OP NOTE
Operative Note    Patient name: Celestino Mahoney             Medical Record Number: [id number]    Primary Care Physician: Whitley Kahn MD     1960    Date of Procedure: 3/13/2023    Pre-operative Diagnosis:  1.  1.5cm right apex of scalp squamous cell carcinoma       2. Newly appearing left apex of scalp ulcerated suspicious lesion    Post-operative Diagnosis:  1. Same       2. Basal cell carcinoma of left apex of scalp    Procedure Performed:  . Excision of both right and left apex of scalp skin carcinomas creating 8cm2 total neighboring defects that was repaired with an adjacent tissue transfer (48 cm2) (CPT 69358)    Surgeons/Assistants: Dr. Pearl Soto MD     Estimated Blood Loss: 3ml     Complications: none immediately appreciated    Procedure: With the patient lying in the supine position and under adequate anesthesia per the anesthesia team, the area was anesthetized with a total of 19 ml of 1% Lidocaine 1:100,000 with epinephrine solution. The area was then prepped and draped in the standard surgical fashion. The known skin cancer of right apex of scalp was excised at 2.5cm diameter while the left neighboring newly identified ulcerated 1cm lesion was excised at 2cm diameter and both were sent for fresh frozen evaluation. Pathology called back to the room and stated indeed the margin were clear (noting that the left apex of scalp lesion was a basal cell carcinoma). There were sizeable neighboring defects (8cm2 total) , which could not be closed primarily due tot tension. Therefore, a 48cm2 (10cm x 4cm + 8cm2) sum of defect/adjacent tissue transfer posteriorly based rotation flap was then designed, back cut 10cm, elevated 4cm and inset with 3-0 Monocryl suture placed in interrupted buried fashion. Note that the left apex of scalp brain stimulator line was identified and preserved during the dissection.   The Burow's triangles were resected with final closure being 4-0 chromic and skin staples. Bacitracin, xeroform with bulky sterile head wrap held in place with ACE wrap. The patient tolerated the procedure quite well and remained hemodynamically stable throughout the procedure and was quite comfortable throughout the operative course. Clinical staging for cancer cases:  Laisha Godwin MD  Electronically signed by me on 3/13/2023 at 10:44 AM Operative Note      Patient: Mikhail Allen  YOB: 1960  MRN: [id number]    Date of Procedure: 3/13/2023    Pre-Op Diagnosis: SCC (squamous cell carcinoma), scalp/neck [C44.42]    Post-Op Diagnosis: Same       Procedure(s):  SCC RIGHT VERTEX OF SCALP    Surgeon(s):  Mayra Levin MD    Assistant:   * No surgical staff found *    Anesthesia: Monitor Anesthesia Care    Estimated Blood Loss (mL): less than 50     Complications: None    Specimens:   ID Type Source Tests Collected by Time Destination   A :  Tissue Scalp SURGICAL PATHOLOGY Mayra Levin MD 3/13/2023 0911    B :  Tissue Scalp SURGICAL PATHOLOGY Mayra Levin MD 3/13/2023 3768        Implants:  * No implants in log *      Drains: * No LDAs found *    Findings: 1.  1.5cm right apex of scalp squamous cell carcinoma       2. Newly appearing left apex of scalp ulcerated suspicious lesion    Detailed Description of Procedure:   1/2.   Excision of both right and left apex of scalp skin carcinomas creating 8cm2 total neighboring defects that was repaired with an adjacent tissue transfer (48 cm2) (CPT 79668)    Electronically signed by Mayra Levin MD on 3/13/2023 at 10:44 AM

## 2023-03-13 NOTE — PROGRESS NOTES
1041 Pt to phase 2 recovery per cart. Report from [facility]. Pt awake and alert. Helmet dressing dry and intact. Pt denies pain. 1045 Pt's sister at bedside. 1048 Pt taking offered snack. 1100 Dr Kiki Fagan in to see pt and sister.    1105 Report given to MEGAN Montero

## 2023-03-13 NOTE — ANESTHESIA POSTPROCEDURE EVALUATION
Department of Anesthesiology  Postprocedure Note    Patient: Celestino Mahoney  MRN: [id number]  YOB: 1960  Date of evaluation: 3/13/2023      Procedure Summary     Date: 03/13/23 Room / Location: [facility] 04 / [facility]    Anesthesia Start: 9149 Anesthesia Stop: 9578    Procedure: SCC RIGHT VERTEX OF SCALP (Right) Diagnosis:       SCC (squamous cell carcinoma), scalp/neck      (SCC (squamous cell carcinoma), scalp/neck [C44.42])    Surgeons: Pearl Soto MD Responsible Provider: Carolina Torres MD    Anesthesia Type: MAC ASA Status: 3          Anesthesia Type: No value filed.     Jared Phase I:      Jared Phase II: Jared Score: 10      Anesthesia Post Evaluation    Patient location during evaluation: PACU  Patient participation: complete - patient participated  Level of consciousness: awake and alert  Airway patency: patent  Nausea & Vomiting: no nausea  Complications: no  Cardiovascular status: blood pressure returned to baseline and hemodynamically stable  Respiratory status: acceptable and spontaneous ventilation  Hydration status: euvolemic

## 2023-03-15 NOTE — H&P
[facility]  History and Physical Update    Pt Name: Luciano Anthony  MRN: [id number]  YOB: 1960  Date of evaluation: 3/15/2023    I have examined the patient and reviewed the H&P/Consult and there are no changes to the patient or plans.       Greg Yin MD  Electronically signed 3/15/2023 at 7:01 AM

## 2024-01-14 PROBLEM — F33.0 MAJOR DEPRESSIVE DISORDER, RECURRENT, MILD: Status: ACTIVE | Noted: 2024-01-14

## 2024-01-14 PROBLEM — G20.A1 PARKINSON'S DISEASE: Status: ACTIVE | Noted: 2024-01-14

## 2025-03-13 ENCOUNTER — HOSPITAL ENCOUNTER (INPATIENT)
Age: 65
LOS: 7 days | Discharge: NURSING, REHAB OR LONG-TERM CARE | DRG: 565 | End: 2025-03-20
Attending: FAMILY MEDICINE | Admitting: INTERNAL MEDICINE
Payer: MEDICARE

## 2025-03-13 ENCOUNTER — APPOINTMENT (OUTPATIENT)
Dept: GENERAL RADIOLOGY | Age: 65
DRG: 565 | End: 2025-03-13
Payer: MEDICARE

## 2025-03-13 ENCOUNTER — APPOINTMENT (OUTPATIENT)
Dept: CT IMAGING | Age: 65
DRG: 565 | End: 2025-03-13
Payer: MEDICARE

## 2025-03-13 ENCOUNTER — APPOINTMENT (OUTPATIENT)
Dept: ULTRASOUND IMAGING | Age: 65
DRG: 565 | End: 2025-03-13
Payer: MEDICARE

## 2025-03-13 DIAGNOSIS — N17.9 ACUTE RENAL FAILURE, UNSPECIFIED ACUTE RENAL FAILURE TYPE: Primary | ICD-10-CM

## 2025-03-13 DIAGNOSIS — T79.6XXA TRAUMATIC RHABDOMYOLYSIS, INITIAL ENCOUNTER: ICD-10-CM
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
## 2025-03-13 LAB
ALBUMIN SERPL BCG-MCNC: 4.1 G/DL (ref 3.4–4.9)
ALP SERPL-CCNC: 117 U/L (ref 40–129)
ALT SERPL W/O P-5'-P-CCNC: 58 U/L (ref 10–50)
ANION GAP SERPL CALC-SCNC: 13 MEQ/L (ref 8–16)
AST SERPL-CCNC: 172 U/L (ref 10–50)
BASOPHILS ABSOLUTE: 0 THOU/MM3 (ref 0–0.1)
BASOPHILS NFR BLD AUTO: 0.1 %
BILIRUB CONJ SERPL-MCNC: 0.5 MG/DL (ref 0–0.2)
BILIRUB SERPL-MCNC: 1.2 MG/DL (ref 0.3–1.2)
BUN SERPL-MCNC: 60 MG/DL (ref 8–23)
CALCIUM SERPL-MCNC: 9.9 MG/DL (ref 8.8–10.2)
CHLORIDE SERPL-SCNC: 104 MEQ/L (ref 98–111)
CK SERPL-CCNC: 3702 U/L (ref 39–308)
CO2 SERPL-SCNC: 26 MEQ/L (ref 22–29)
CREAT SERPL-MCNC: 3.7 MG/DL (ref 0.7–1.2)
DEPRECATED RDW RBC AUTO: 40.7 FL (ref 35–45)
EKG ATRIAL RATE: 66 BPM
EKG P-R INTERVAL: 178 MS
EKG Q-T INTERVAL: 390 MS
EKG QRS DURATION: 92 MS
EKG QTC CALCULATION (BAZETT): 408 MS
EKG R AXIS: 117 DEGREES
EKG T AXIS: 122 DEGREES
EKG VENTRICULAR RATE: 66 BPM
EOSINOPHIL NFR BLD AUTO: 0 %
EOSINOPHILS ABSOLUTE: 0 THOU/MM3 (ref 0–0.4)
ERYTHROCYTE [DISTWIDTH] IN BLOOD BY AUTOMATED COUNT: 13.8 % (ref 11.5–14.5)
FLUAV RNA RESP QL NAA+PROBE: NOT DETECTED
FLUBV RNA RESP QL NAA+PROBE: NOT DETECTED
GFR SERPL CREATININE-BSD FRML MDRD: 17 ML/MIN/1.73M2
GLUCOSE SERPL-MCNC: 141 MG/DL (ref 74–109)
HCT VFR BLD AUTO: 41.9 % (ref 42–52)
HGB BLD-MCNC: 14.1 GM/DL (ref 14–18)
IMM GRANULOCYTES # BLD AUTO: 0.05 THOU/MM3 (ref 0–0.07)
IMM GRANULOCYTES NFR BLD AUTO: 0.4 %
LYMPHOCYTES ABSOLUTE: 1.4 THOU/MM3 (ref 1–4.8)
LYMPHOCYTES NFR BLD AUTO: 10 %
MAGNESIUM SERPL-MCNC: 2.3 MG/DL (ref 1.6–2.6)
MCH RBC QN AUTO: 27.7 PG (ref 26–33)
MCHC RBC AUTO-ENTMCNC: 33.7 GM/DL (ref 32.2–35.5)
MCV RBC AUTO: 82.3 FL (ref 80–94)
MONOCYTES ABSOLUTE: 1.2 THOU/MM3 (ref 0.4–1.3)
MONOCYTES NFR BLD AUTO: 8.9 %
NEUTROPHILS ABSOLUTE: 11.2 THOU/MM3 (ref 1.8–7.7)
NEUTROPHILS NFR BLD AUTO: 80.6 %
NRBC BLD AUTO-RTO: 0 /100 WBC
OSMOLALITY SERPL CALC.SUM OF ELEC: 304.2 MOSMOL/KG (ref 275–300)
PLATELET # BLD AUTO: 243 THOU/MM3 (ref 130–400)
PMV BLD AUTO: 11.1 FL (ref 9.4–12.4)
POTASSIUM SERPL-SCNC: 3.6 MEQ/L (ref 3.5–5.2)
PROT SERPL-MCNC: 7 G/DL (ref 6.4–8.3)
RBC # BLD AUTO: 5.09 MILL/MM3 (ref 4.7–6.1)
SARS-COV-2 RNA RESP QL NAA+PROBE: NOT DETECTED
SODIUM SERPL-SCNC: 143 MEQ/L (ref 135–145)
TROPONIN, HIGH SENSITIVITY: 49 NG/L (ref 0–12)
TROPONIN, HIGH SENSITIVITY: 53 NG/L (ref 0–12)
TSH SERPL DL<=0.05 MIU/L-ACNC: 2.17 UIU/ML (ref 0.27–4.2)
WBC # BLD AUTO: 13.9 THOU/MM3 (ref 4.8–10.8)

## 2025-03-13 PROCEDURE — 84484 ASSAY OF TROPONIN QUANT: CPT

## 2025-03-13 PROCEDURE — 71045 X-RAY EXAM CHEST 1 VIEW: CPT

## 2025-03-13 PROCEDURE — 99223 1ST HOSP IP/OBS HIGH 75: CPT | Performed by: PHYSICIAN ASSISTANT

## 2025-03-13 PROCEDURE — 6370000000 HC RX 637 (ALT 250 FOR IP): Performed by: PHYSICIAN ASSISTANT

## 2025-03-13 PROCEDURE — 73564 X-RAY EXAM KNEE 4 OR MORE: CPT

## 2025-03-13 PROCEDURE — 96360 HYDRATION IV INFUSION INIT: CPT

## 2025-03-13 PROCEDURE — 73070 X-RAY EXAM OF ELBOW: CPT

## 2025-03-13 PROCEDURE — 6360000002 HC RX W HCPCS: Performed by: PHYSICIAN ASSISTANT

## 2025-03-13 PROCEDURE — 70450 CT HEAD/BRAIN W/O DYE: CPT

## 2025-03-13 PROCEDURE — 36415 COLL VENOUS BLD VENIPUNCTURE: CPT

## 2025-03-13 PROCEDURE — 99285 EMERGENCY DEPT VISIT HI MDM: CPT

## 2025-03-13 PROCEDURE — 85025 COMPLETE CBC W/AUTO DIFF WBC: CPT

## 2025-03-13 PROCEDURE — 93005 ELECTROCARDIOGRAM TRACING: CPT | Performed by: FAMILY MEDICINE

## 2025-03-13 PROCEDURE — 1200000003 HC TELEMETRY R&B

## 2025-03-13 PROCEDURE — 2580000003 HC RX 258: Performed by: PHYSICIAN ASSISTANT

## 2025-03-13 PROCEDURE — 72125 CT NECK SPINE W/O DYE: CPT

## 2025-03-13 PROCEDURE — 80053 COMPREHEN METABOLIC PANEL: CPT

## 2025-03-13 PROCEDURE — 82248 BILIRUBIN DIRECT: CPT

## 2025-03-13 PROCEDURE — 84443 ASSAY THYROID STIM HORMONE: CPT

## 2025-03-13 PROCEDURE — 87636 SARSCOV2 & INF A&B AMP PRB: CPT

## 2025-03-13 PROCEDURE — 93010 ELECTROCARDIOGRAM REPORT: CPT | Performed by: INTERNAL MEDICINE

## 2025-03-13 PROCEDURE — 83735 ASSAY OF MAGNESIUM: CPT

## 2025-03-13 PROCEDURE — 2580000003 HC RX 258

## 2025-03-13 PROCEDURE — 76770 US EXAM ABDO BACK WALL COMP: CPT

## 2025-03-13 PROCEDURE — 82550 ASSAY OF CK (CPK): CPT

## 2025-03-13 RX ORDER — ACETAMINOPHEN 650 MG/1
650 SUPPOSITORY RECTAL EVERY 6 HOURS PRN
Status: DISCONTINUED | OUTPATIENT
Start: 2025-03-13 | End: 2025-03-20 | Stop reason: HOSPADM

## 2025-03-13 RX ORDER — SODIUM CHLORIDE 0.9 % (FLUSH) 0.9 %
5-40 SYRINGE (ML) INJECTION PRN
Status: DISCONTINUED | OUTPATIENT
Start: 2025-03-13 | End: 2025-03-20 | Stop reason: HOSPADM

## 2025-03-13 RX ORDER — HYDROCHLOROTHIAZIDE 25 MG/1
25 TABLET ORAL DAILY
Status: DISCONTINUED | OUTPATIENT
Start: 2025-03-14 | End: 2025-03-18

## 2025-03-13 RX ORDER — 0.9 % SODIUM CHLORIDE 0.9 %
1000 INTRAVENOUS SOLUTION INTRAVENOUS ONCE
Status: COMPLETED | OUTPATIENT
Start: 2025-03-13 | End: 2025-03-13

## 2025-03-13 RX ORDER — SODIUM CHLORIDE, SODIUM LACTATE, POTASSIUM CHLORIDE, CALCIUM CHLORIDE 600; 310; 30; 20 MG/100ML; MG/100ML; MG/100ML; MG/100ML
INJECTION, SOLUTION INTRAVENOUS CONTINUOUS
Status: DISCONTINUED | OUTPATIENT
Start: 2025-03-13 | End: 2025-03-16

## 2025-03-13 RX ORDER — METOPROLOL SUCCINATE 50 MG/1
50 TABLET, EXTENDED RELEASE ORAL DAILY
Status: DISCONTINUED | OUTPATIENT
Start: 2025-03-14 | End: 2025-03-20 | Stop reason: HOSPADM

## 2025-03-13 RX ORDER — SODIUM CHLORIDE 0.9 % (FLUSH) 0.9 %
5-40 SYRINGE (ML) INJECTION EVERY 12 HOURS SCHEDULED
Status: DISCONTINUED | OUTPATIENT
Start: 2025-03-13 | End: 2025-03-20 | Stop reason: HOSPADM

## 2025-03-13 RX ORDER — ONDANSETRON 2 MG/ML
4 INJECTION INTRAMUSCULAR; INTRAVENOUS EVERY 6 HOURS PRN
Status: DISCONTINUED | OUTPATIENT
Start: 2025-03-13 | End: 2025-03-20 | Stop reason: HOSPADM

## 2025-03-13 RX ORDER — ATORVASTATIN CALCIUM 10 MG/1
10 TABLET, FILM COATED ORAL DAILY
Status: DISCONTINUED | OUTPATIENT
Start: 2025-03-14 | End: 2025-03-18

## 2025-03-13 RX ORDER — LOSARTAN POTASSIUM AND HYDROCHLOROTHIAZIDE 25; 100 MG/1; MG/1
1 TABLET ORAL DAILY
Status: DISCONTINUED | OUTPATIENT
Start: 2025-03-13 | End: 2025-03-13

## 2025-03-13 RX ORDER — PANTOPRAZOLE SODIUM 40 MG/1
40 TABLET, DELAYED RELEASE ORAL DAILY
Status: DISCONTINUED | OUTPATIENT
Start: 2025-03-14 | End: 2025-03-20 | Stop reason: HOSPADM

## 2025-03-13 RX ORDER — SODIUM CHLORIDE 9 MG/ML
INJECTION, SOLUTION INTRAVENOUS PRN
Status: DISCONTINUED | OUTPATIENT
Start: 2025-03-13 | End: 2025-03-20 | Stop reason: HOSPADM

## 2025-03-13 RX ORDER — ONDANSETRON 4 MG/1
4 TABLET, ORALLY DISINTEGRATING ORAL EVERY 8 HOURS PRN
Status: DISCONTINUED | OUTPATIENT
Start: 2025-03-13 | End: 2025-03-20 | Stop reason: HOSPADM

## 2025-03-13 RX ORDER — POLYETHYLENE GLYCOL 3350 17 G/17G
17 POWDER, FOR SOLUTION ORAL DAILY PRN
Status: DISCONTINUED | OUTPATIENT
Start: 2025-03-13 | End: 2025-03-20 | Stop reason: HOSPADM

## 2025-03-13 RX ORDER — ACETAMINOPHEN 325 MG/1
650 TABLET ORAL EVERY 6 HOURS PRN
Status: DISCONTINUED | OUTPATIENT
Start: 2025-03-13 | End: 2025-03-20 | Stop reason: HOSPADM

## 2025-03-13 RX ORDER — 0.9 % SODIUM CHLORIDE 0.9 %
2000 INTRAVENOUS SOLUTION INTRAVENOUS ONCE
Status: COMPLETED | OUTPATIENT
Start: 2025-03-13 | End: 2025-03-13

## 2025-03-13 RX ORDER — LOSARTAN POTASSIUM 100 MG/1
100 TABLET ORAL DAILY
Status: DISCONTINUED | OUTPATIENT
Start: 2025-03-14 | End: 2025-03-18

## 2025-03-13 RX ORDER — CARBIDOPA AND LEVODOPA 25; 100 MG/1; MG/1
1 TABLET ORAL 3 TIMES DAILY
Status: DISCONTINUED | OUTPATIENT
Start: 2025-03-13 | End: 2025-03-20 | Stop reason: HOSPADM

## 2025-03-13 RX ORDER — HEPARIN SODIUM 5000 [USP'U]/ML
5000 INJECTION, SOLUTION INTRAVENOUS; SUBCUTANEOUS EVERY 8 HOURS SCHEDULED
Status: DISCONTINUED | OUTPATIENT
Start: 2025-03-13 | End: 2025-03-20 | Stop reason: HOSPADM

## 2025-03-13 RX ADMIN — SODIUM CHLORIDE 2000 ML: 0.9 INJECTION, SOLUTION INTRAVENOUS at 20:07

## 2025-03-13 RX ADMIN — SODIUM CHLORIDE, SODIUM LACTATE, POTASSIUM CHLORIDE, AND CALCIUM CHLORIDE: .6; .31; .03; .02 INJECTION, SOLUTION INTRAVENOUS at 23:55

## 2025-03-13 RX ADMIN — CARBIDOPA AND LEVODOPA 1 TABLET: 25; 100 TABLET ORAL at 23:54

## 2025-03-13 RX ADMIN — HEPARIN SODIUM 5000 UNITS: 5000 INJECTION INTRAVENOUS; SUBCUTANEOUS at 23:53

## 2025-03-13 RX ADMIN — SODIUM CHLORIDE 1000 ML: 0.9 INJECTION, SOLUTION INTRAVENOUS at 17:46

## 2025-03-13 NOTE — ED TRIAGE NOTES
Pt presents to ER via [facility] for multiple falls. Pt has parkinson's disease and lives at home alone. Family found pt on the floor yesterday and helped him to the chair. Pt slipped on kitchen floor today and hit his head and is complaining of neck pain.  C-collar in place via EMS. Pt denies any thinners or LOC. Pt has been having diarrhea the past few days and has not eaten anything today.

## 2025-03-13 NOTE — ED PROVIDER NOTES
forehead and cheek without any step-offs or hematomas appreciated. Patient was in a c-collar and noted that he had cervical spine tenderness but no step-offs were appreciated.  Rest of spine no tenderness or step-offs appreciated. Patient had tenderness at the front of his chest bilaterally where abrasions are seen with equal bilateral breath sounds.  Patient's labs showed significant creatinine 3.7 (baseline appears to be 1), BUN 60, leukocytosis 13.9, and CK of 3,702.  Patient started on 1L NS.  Patient has been unable to urinate so far here in the ED.  Patient family on results and informed that he needs to be admitted for acute kidney injury and rhabdomyolysis.  Patient's family agrees with plan and hospitalist consulted and can accepted patient's admission.    ED COURSE   ED Medications administered this visit (None if left blank):   Medications   sodium chloride 0.9 % bolus 1,000 mL (1,000 mLs IntraVENous New Bag 3/13/25 3536)                PROCEDURES: (None if blank)  Procedures:     CRITICAL CARE:  None    MEDICATION CHANGES     New Prescriptions    No medications on file         FINAL DISPOSITION   Shared Decision-Making was performed, disposition discussed with the patient/family and questions answered.      Outpatient follow up (If applicable):  No follow-up provider specified.  Not applicable              FINAL DIAGNOSES:  Final diagnoses:   Acute renal failure, unspecified acute renal failure type   Fall, initial encounter   Traumatic rhabdomyolysis, initial encounter       Condition: condition: fair  Dispo: Admit to med/surg floor  DISPOSITION Admitted 03/13/2025 07:02:07 PM               This transcription was electronically signed. It was dictated by use of voice recognition software and electronically transcribed. The transcription may contain errors not detected in proofreading.    Electronically signed by Roro Ji MD on 3/13/25 at 2:55 PM EDT

## 2025-03-14 LAB
ALBUMIN SERPL BCG-MCNC: 3.1 G/DL (ref 3.4–4.9)
ALP SERPL-CCNC: 89 U/L (ref 40–129)
ALT SERPL W/O P-5'-P-CCNC: 16 U/L (ref 10–50)
ANION GAP SERPL CALC-SCNC: 11 MEQ/L (ref 8–16)
AST SERPL-CCNC: 181 U/L (ref 10–50)
BACTERIA: ABNORMAL
BASOPHILS ABSOLUTE: 0 THOU/MM3 (ref 0–0.1)
BASOPHILS NFR BLD AUTO: 0.2 %
BILIRUB CONJ SERPL-MCNC: 0.4 MG/DL (ref 0–0.2)
BILIRUB SERPL-MCNC: 0.7 MG/DL (ref 0.3–1.2)
BILIRUB UR QL STRIP: NEGATIVE
BUN SERPL-MCNC: 54 MG/DL (ref 8–23)
CALCIUM SERPL-MCNC: 8.4 MG/DL (ref 8.8–10.2)
CASTS #/AREA URNS LPF: ABNORMAL /LPF
CASTS #/AREA URNS LPF: ABNORMAL /LPF
CHARACTER UR: ABNORMAL
CHARCOAL URNS QL MICRO: ABNORMAL
CHLORIDE 24H UR-SRATE: 69 MEQ/L
CHLORIDE SERPL-SCNC: 108 MEQ/L (ref 98–111)
CK SERPL-CCNC: 3191 U/L (ref 39–308)
CO2 SERPL-SCNC: 22 MEQ/L (ref 22–29)
COLOR UR: YELLOW
CREAT SERPL-MCNC: 2.2 MG/DL (ref 0.7–1.2)
CREAT UR-MCNC: 318 MG/DL
CRYSTALS URNS QL MICRO: ABNORMAL
DEPRECATED RDW RBC AUTO: 43.7 FL (ref 35–45)
EOSINOPHIL NFR BLD AUTO: 0.6 %
EOSINOPHILS ABSOLUTE: 0.1 THOU/MM3 (ref 0–0.4)
EPITHELIAL CELLS, UA: ABNORMAL /HPF
ERYTHROCYTE [DISTWIDTH] IN BLOOD BY AUTOMATED COUNT: 14.2 % (ref 11.5–14.5)
GFR SERPL CREATININE-BSD FRML MDRD: 32 ML/MIN/1.73M2
GLUCOSE SERPL-MCNC: 101 MG/DL (ref 74–109)
GLUCOSE UR QL STRIP.AUTO: NEGATIVE MG/DL
HCT VFR BLD AUTO: 33.5 % (ref 42–52)
HGB BLD-MCNC: 11.1 GM/DL (ref 14–18)
HGB UR QL STRIP.AUTO: ABNORMAL
HGB UR QL STRIP.AUTO: POSITIVE
IMM GRANULOCYTES # BLD AUTO: 0.03 THOU/MM3 (ref 0–0.07)
IMM GRANULOCYTES NFR BLD AUTO: 0.3 %
KETONES UR QL STRIP.AUTO: ABNORMAL
LEUKOCYTE ESTERASE UR QL STRIP.AUTO: NEGATIVE
LYMPHOCYTES ABSOLUTE: 2.5 THOU/MM3 (ref 1–4.8)
LYMPHOCYTES NFR BLD AUTO: 26.1 %
MAGNESIUM SERPL-MCNC: 1.9 MG/DL (ref 1.6–2.6)
MCH RBC QN AUTO: 27.8 PG (ref 26–33)
MCHC RBC AUTO-ENTMCNC: 33.1 GM/DL (ref 32.2–35.5)
MCV RBC AUTO: 83.8 FL (ref 80–94)
MONOCYTES ABSOLUTE: 0.8 THOU/MM3 (ref 0.4–1.3)
MONOCYTES NFR BLD AUTO: 8 %
NEUTROPHILS ABSOLUTE: 6.3 THOU/MM3 (ref 1.8–7.7)
NEUTROPHILS NFR BLD AUTO: 64.8 %
NITRITE UR QL STRIP.AUTO: NEGATIVE
NRBC BLD AUTO-RTO: 0 /100 WBC
OSMOLALITY SERPL CALC.SUM OF ELEC: 296.2 MOSMOL/KG (ref 275–300)
PH UR STRIP.AUTO: 5 [PH] (ref 5–9)
PLATELET # BLD AUTO: 183 THOU/MM3 (ref 130–400)
PMV BLD AUTO: 11.4 FL (ref 9.4–12.4)
POTASSIUM SERPL-SCNC: 3.3 MEQ/L (ref 3.5–5.2)
POTASSIUM UR-SCNC: 94.4 MEQ/L
PROT SERPL-MCNC: 5.3 G/DL (ref 6.4–8.3)
PROT UR STRIP.AUTO-MCNC: 100 MG/DL
RBC # BLD AUTO: 4 MILL/MM3 (ref 4.7–6.1)
RBC #/AREA URNS HPF: ABNORMAL /HPF
RENAL EPI CELLS #/AREA URNS HPF: ABNORMAL /[HPF]
SODIUM SERPL-SCNC: 141 MEQ/L (ref 135–145)
SODIUM UR-SCNC: 60 MEQ/L
SPECIFIC GRAVITY UA: 1.02 (ref 1–1.03)
UROBILINOGEN, URINE: 0.2 EU/DL (ref 0–1)
WBC # BLD AUTO: 9.7 THOU/MM3 (ref 4.8–10.8)
WBC #/AREA URNS HPF: ABNORMAL /HPF
YEAST LIKE FUNGI URNS QL MICRO: ABNORMAL

## 2025-03-14 PROCEDURE — 97535 SELF CARE MNGMENT TRAINING: CPT

## 2025-03-14 PROCEDURE — 84300 ASSAY OF URINE SODIUM: CPT

## 2025-03-14 PROCEDURE — 97166 OT EVAL MOD COMPLEX 45 MIN: CPT

## 2025-03-14 PROCEDURE — 1200000003 HC TELEMETRY R&B

## 2025-03-14 PROCEDURE — 82550 ASSAY OF CK (CPK): CPT

## 2025-03-14 PROCEDURE — 36415 COLL VENOUS BLD VENIPUNCTURE: CPT

## 2025-03-14 PROCEDURE — 99233 SBSQ HOSP IP/OBS HIGH 50: CPT | Performed by: FAMILY MEDICINE

## 2025-03-14 PROCEDURE — 97530 THERAPEUTIC ACTIVITIES: CPT

## 2025-03-14 PROCEDURE — 83874 ASSAY OF MYOGLOBIN: CPT

## 2025-03-14 PROCEDURE — 80048 BASIC METABOLIC PNL TOTAL CA: CPT

## 2025-03-14 PROCEDURE — 99222 1ST HOSP IP/OBS MODERATE 55: CPT | Performed by: STUDENT IN AN ORGANIZED HEALTH CARE EDUCATION/TRAINING PROGRAM

## 2025-03-14 PROCEDURE — 83735 ASSAY OF MAGNESIUM: CPT

## 2025-03-14 PROCEDURE — 97129 THER IVNTJ 1ST 15 MIN: CPT

## 2025-03-14 PROCEDURE — 6360000002 HC RX W HCPCS: Performed by: PHYSICIAN ASSISTANT

## 2025-03-14 PROCEDURE — 82570 ASSAY OF URINE CREATININE: CPT

## 2025-03-14 PROCEDURE — 85025 COMPLETE CBC W/AUTO DIFF WBC: CPT

## 2025-03-14 PROCEDURE — 92610 EVALUATE SWALLOWING FUNCTION: CPT

## 2025-03-14 PROCEDURE — 92523 SPEECH SOUND LANG COMPREHEN: CPT

## 2025-03-14 PROCEDURE — 6370000000 HC RX 637 (ALT 250 FOR IP): Performed by: PHYSICIAN ASSISTANT

## 2025-03-14 PROCEDURE — 81001 URINALYSIS AUTO W/SCOPE: CPT

## 2025-03-14 PROCEDURE — 84133 ASSAY OF URINE POTASSIUM: CPT

## 2025-03-14 PROCEDURE — 82436 ASSAY OF URINE CHLORIDE: CPT

## 2025-03-14 PROCEDURE — 80076 HEPATIC FUNCTION PANEL: CPT

## 2025-03-14 PROCEDURE — 2580000003 HC RX 258: Performed by: PHYSICIAN ASSISTANT

## 2025-03-14 RX ORDER — POTASSIUM CHLORIDE 7.45 MG/ML
10 INJECTION INTRAVENOUS PRN
Status: DISCONTINUED | OUTPATIENT
Start: 2025-03-14 | End: 2025-03-20 | Stop reason: HOSPADM

## 2025-03-14 RX ORDER — POTASSIUM CHLORIDE 1500 MG/1
40 TABLET, EXTENDED RELEASE ORAL PRN
Status: DISCONTINUED | OUTPATIENT
Start: 2025-03-14 | End: 2025-03-20 | Stop reason: HOSPADM

## 2025-03-14 RX ORDER — MAGNESIUM SULFATE IN WATER 40 MG/ML
2000 INJECTION, SOLUTION INTRAVENOUS PRN
Status: DISCONTINUED | OUTPATIENT
Start: 2025-03-14 | End: 2025-03-20 | Stop reason: HOSPADM

## 2025-03-14 RX ADMIN — ATORVASTATIN CALCIUM 10 MG: 10 TABLET, FILM COATED ORAL at 07:56

## 2025-03-14 RX ADMIN — CARBIDOPA AND LEVODOPA 1 TABLET: 25; 100 TABLET ORAL at 20:42

## 2025-03-14 RX ADMIN — PANTOPRAZOLE SODIUM 40 MG: 40 TABLET, DELAYED RELEASE ORAL at 07:56

## 2025-03-14 RX ADMIN — SERTRALINE 50 MG: 50 TABLET, FILM COATED ORAL at 07:56

## 2025-03-14 RX ADMIN — METOPROLOL SUCCINATE 50 MG: 50 TABLET, FILM COATED, EXTENDED RELEASE ORAL at 07:56

## 2025-03-14 RX ADMIN — HEPARIN SODIUM 5000 UNITS: 5000 INJECTION INTRAVENOUS; SUBCUTANEOUS at 20:42

## 2025-03-14 RX ADMIN — CARBIDOPA AND LEVODOPA 1 TABLET: 25; 100 TABLET ORAL at 14:39

## 2025-03-14 RX ADMIN — CARBIDOPA AND LEVODOPA 1 TABLET: 25; 100 TABLET ORAL at 07:56

## 2025-03-14 RX ADMIN — HEPARIN SODIUM 5000 UNITS: 5000 INJECTION INTRAVENOUS; SUBCUTANEOUS at 14:38

## 2025-03-14 RX ADMIN — SODIUM CHLORIDE, SODIUM LACTATE, POTASSIUM CHLORIDE, AND CALCIUM CHLORIDE: .6; .31; .03; .02 INJECTION, SOLUTION INTRAVENOUS at 17:58

## 2025-03-14 RX ADMIN — HEPARIN SODIUM 5000 UNITS: 5000 INJECTION INTRAVENOUS; SUBCUTANEOUS at 05:56

## 2025-03-14 RX ADMIN — SODIUM CHLORIDE, SODIUM LACTATE, POTASSIUM CHLORIDE, AND CALCIUM CHLORIDE: .6; .31; .03; .02 INJECTION, SOLUTION INTRAVENOUS at 08:33

## 2025-03-14 NOTE — CONSULTS
PULMONARY clinic Consult H&P    Reason for consult:  Lung mass    HPI    Donita Washington is a 89 year old female who presents with history of hypertension, GERD, hyperlipidemia. I was asked to see the patient at the request of Dr Horne. The patient is a current every Day smoker for 50 years (25 PY). She had CT scan of the chest that was done for chronic cough from November 9, 2017. Talking to her this AM she reports that her breathing is fine. There is no PND. There is no orthopnea. There is no wheezing. There is no chest pains. There is coughing that is chronic and mild. The cough is productive of whitish and little yellow sputum. There is no blood in sputum. No fevers or chills. Appetite is good. There is no recent weight loss. There is no abdominal pain, no nausea or vomiting. There is constipation with no diarrhea. The constipation is not new to her. There is no headaches, no neck pain and no back pain. She has some hip pain which is intermittent which is not new related to arthritis. No history of cancer in the past. There is bilateral LE swelling and she takes lasix for it. She is planning to quit smoking soon. She dose not have a quit date in mind.     Current Outpatient Prescriptions   Medication Sig   • atorvastatin (LIPITOR) 40 MG tablet Take 1 tablet by mouth daily.   • levothyroxine (SYNTHROID, LEVOTHROID) 50 MCG tablet Take 1 tablet by mouth daily.   • losartan (COZAAR) 100 MG tablet TAKE ONE TABLET BY MOUTH ONCE DAILY FOR HIGH BLOOD PRESSURE   • enalapril (VASOTEC) 20 MG tablet TAKE 1 TABLET BY MOUTH DAILY   • furosemide (LASIX) 20 MG tablet TAKE 1 TABLET BY MOUTH DAILY   • Calcium Carb-Cholecalciferol (CALCIUM 1000 + D) 1000-800 MG-UNIT Tab Take 1 tablet by mouth daily.   • Omega-3 Fatty Acids (FISH OIL) 1000 MG capsule Take 2 g by mouth daily.   • Multiple Vitamins-Minerals (WOMENS 50+ MULTI VITAMIN/MIN) Tab Take 1 tablet by mouth daily.   • vitamin E 400 UNIT capsule Take 1 capsule by mouth  daily.   • Biotin 1 MG Cap    • GLUCOSAMINE CHONDROITIN COMPLX PO Take 2 capsules by mouth daily.   • diphenhydramine-acetaminophen (TYLENOL PM)  MG TABS Take 1.5 tablets by mouth nightly as needed.   • aspirin 81 MG tablet Take 1 tablet by mouth daily.   • Cholecalciferol (VITAMIN D) 2000 UNIT tablet Take 2,000 Units by mouth daily.   • enalapril (VASOTEC) 20 MG tablet Take 1 tablet by mouth daily.     No current facility-administered medications for this visit.          Past Medical History:   Diagnosis Date   • Herpes zoster without mention of complication 1995    L shoulder   • Osteoarthrosis, unspecified whether generalized or localized, unspecified site    • Pure hypercholesterolemia    • Reflux esophagitis    • Tobacco use disorder 4/26/2011   • Unspecified essential hypertension           Past Surgical History:   Procedure Laterality Date   • APPENDECTOMY  33   • COLONOSCOPY DIAGNOSTIC THRU STOMA  95,00,06    polyps in 95; none 00,06; extensive, diverticuloisis   • DEXA BONE DENSITY AXIAL SKELETON  97,03    nl; T = +0.2 and 0.0   • EKG REPORT  03    nl   • REMOVE TONSILS/ADENOIDS,<13 Y/O  26   • REMV CATARACT EXTRACAP INSERT LENS  02/20/2014    IOL right eye ZCBOO 22.0 Dr. Marcum   • REMV CATARACT EXTRACAP INSERT LENS  3/6/14    left GPS ZCBOO 21.5   • X-RAY CHEST 2 VW  04    hyperexpanded lungs   • XRAY MAMMOGRAM SCREENING BILAT  99,00,01,02,03,04,05    nl          Family History   Problem Relation Age of Onset   • Heart Mother    • Heart Father    • Heart Sister           Social History     Social History   • Marital status:      Spouse name: Aleksey   • Number of children: 2   • Years of education: 13     Occupational History   • retired      Social History Main Topics   • Smoking status: Current Every Day Smoker     Packs/day: 0.50     Years: 50.00     Types: Cigarettes   • Smokeless tobacco: Never Used   • Alcohol use 0.0 oz/week      Comment: up to 1 per month    • Drug use: No   • Sexual  activity: No     Other Topics Concern   • Not on file     Social History Narrative   • No narrative on file          ALLERGIES:  No Known Allergies      REVIEW OF SYSTEMS      All other systems were reviewed and they were negative.      ================================================================    PHYSICAL EXAM     Vital Last Value   Temperature     Pulse 90 (11/29/17 0739)   Respiratory 20 (11/29/17 0739)   Blood Pressure (!) 195/100 (11/29/17 0739)   Pulse Oximetry       General: Alert and oriented to time place and person, not in distress  HEENT/Neck: No Pallor, no icterus, no injection, PERLLA, EOMI, no JVD, no palpable lymph nodes (cervical and axillary), No thyromegally, MMM.  Heart: Normal S1, S2, irregular pulse, no GMR  Lungs: CTAB, Normal percussion knot, normal respiratory effort  Abdomen: not distended, BS+ve, no palpable masses, no hepatosplenomegaly  Ext: No LE swelling or tenderness, no erythema, no clubbing  Neuro: No focal weakness with normal reflexes  Skin: No rashes, warm to touch    Labs      WBC (K/mcL)   Date Value   10/25/2017 7.5     RBC (mil/mcL)   Date Value   10/25/2017 5.27 (H)     HCT (%)   Date Value   10/25/2017 47.7 (H)     HGB (g/dL)   Date Value   10/25/2017 16.0 (H)     PLT   Date Value   10/25/2017 329 K/mcL   02/11/2014 309 K/mcL   04/27/2012 356 K/uL       Sodium (mmol/L)   Date Value   10/25/2017 140     Potassium (mmol/L)   Date Value   10/25/2017 4.9     Chloride (mmol/L)   Date Value   10/25/2017 102     Glucose (mg/dL)   Date Value   10/25/2017 93     CALCIUM (mg/dL)   Date Value   10/25/2017 9.7   04/27/2012 10.0     CO2 (mmol/L)   Date Value   04/27/2012 29     Carbon Dioxide (mmol/L)   Date Value   10/25/2017 34 (H)     BUN (mg/dL)   Date Value   10/25/2017 13     Creatinine (mg/dL)   Date Value   10/25/2017 0.83     IMAGING & OTHER STUDIES      CT scan of the chest November 9, 2017:  FINDINGS:   Heart size is normal. No adenopathy. Nonobstructing calyceal  calcifications  are present in the left kidney. There is an 18 mm left adrenal nodule which  measures 38 Hounsfield units. Subcentimeter thyroid nodules incidentally  noted. No pleural effusion or pneumothorax. No suspicious mediastinal  masses.      There is subsegmental linear scar/atelectasis at the lung bases. There is a  mixed solid and groundglass peripheral opacification in the right upper  lobe tenting the pleura image 36 series 3 measuring 11 x 24 x 18 mm. Tiny  focus of bronchial wall thickening right upper lobe image 29 series 3.  There is also a 7 x 4 mm area of bronchial wall thickening and nodularity  abutting the major fissure of the right upper lobe image 45 series 3. There  is a 4 mm solid nodule right lower lobe image 100 series 3. There is a  groundglass nodule posterior right lower lobe image 88 series 3 measuring  10 x 9 x 13 mm. 4 mm left upper lobe nodule image 63 series 3. Mild  centrilobular emphysema.     No acute or aggressive osseous lesions.        IMPRESSION:    Scattered solid, mixed solid and subsolid and subsolid  nodules/consolidations detailed above. The most concerning of which there  is a 24 mm mixed solid and groundglass nodule right upper lobe. If tissue  sampling is not performed, consider PET/CT or noncontrast CT follow-up per  the guidelines below.     Left adrenal nodule, indeterminate on today's study. Recommend dedicated  pre and postcontrast adrenal protocol CT.     Nonobstructing nephrolithiasis.     Atherosclerotic vascular disease.     Fleischner Society recommendations for SUBSOLID pulmonary nodules (2017):     Single Subsolid <6:  Single ground-glass or part solid <6 mm  No routine followup. In certain suspicious nodules, consider followup at 2  and 4 years. If solid component or growth develops, consider resection.     Single Subsolid >6:  Single ground-glass or part solid >= 6mm  Ground-glass:  6-12 month CT. If persistent, CT every 2 years until 5  years.  Part  grossly intact, with demonstrated ability to communicate within complex conversation appropriately throughout entire evaluation. Speech intelligibility is WFL, approximating 100% within conversation; No dysarthria. Mild alteration in normal vocal quality functioning with endorsement for increased vocal hoarseness. Highly suspect current cognitive functioning is at baseline level; Patient likely to make successful return to cognitive demands in home and community environments with supportive family. No further skilled ST services indicated at this level of care; Please re-consult ST should further needs develop.     Past Medical History:        Diagnosis Date    Cancer (HCC)     skin    GERD (gastroesophageal reflux disease)     Hyperlipidemia     Hypertension     Parkinson's disease (tremor, stiffness, slow motion, unstable posture) (HCC)        Past Surgical History:        Procedure Laterality Date    CARDIAC CATHETERIZATION      Dr. Mckinney   early 2000   no stents    COLONOSCOPY      last one 2021    DEEP BRAIN STIMULATOR PLACEMENT  2017    for Parkinson's    FACIAL SURGERY Right 3/13/2023    SCC RIGHT VERTEX OF SCALP performed by Khurram Barnes MD at [facility] OR    SKIN BIOPSY         Allergies:    Allergies   Allergen Reactions    Biaxin [Clarithromycin]      Not sure of reaction        Current Medications:   Current Facility-Administered Medications: sodium chloride flush 0.9 % injection 5-40 mL, 5-40 mL, IntraVENous, 2 times per day  sodium chloride flush 0.9 % injection 5-40 mL, 5-40 mL, IntraVENous, PRN  0.9 % sodium chloride infusion, , IntraVENous, PRN  ondansetron (ZOFRAN-ODT) disintegrating tablet 4 mg, 4 mg, Oral, Q8H PRN **OR** ondansetron (ZOFRAN) injection 4 mg, 4 mg, IntraVENous, Q6H PRN  polyethylene glycol (GLYCOLAX) packet 17 g, 17 g, Oral, Daily PRN  acetaminophen (TYLENOL) tablet 650 mg, 650 mg, Oral, Q6H PRN **OR** acetaminophen (TYLENOL) suppository 650 mg, 650 mg, Rectal, Q6H  solid:  3-6 month CT. If persistent and solid component remains <6 mm,  annual CT followup until 5 years. If persistent and solid component >=6 mm,  should be considered highly suspicious (even if unchanged).     Multiple Subsolid:  Multiple ground-glass or part solid  <6 mm:  3-6 month CT; then consider CT at 2 and 4 years.  >=6 mm:  3-6 month CT. Subsequent management based on most suspicious  nodule:  If persistent and solid component remains <6 mm, annual CT  followup until 5 years. If persistent and solid component >=6 mm, should be  considered highly suspicious (even if unchanged).        NOTES   - Does not apply to patients <34 yo, lung cancer screening CT, patients  with immunosuppression, or patients with known primary cancer.   - Measurements refer to mean axial diameter, rounded to the nearest  millimeter.   - Subsolid nodule categories refer to mean diameter of the entire nodule,  including both ground-glass and solid components.    Assessment:    Donita Washington is a 89 year old female with history of hypertension, GERD and hyperlipidemia. She is here today for evaluation of right upper lobe lung nodule that has mixed consistency (solid and ground glass) which is concerning for malignancy like low-grade adenocarcinoma (SHEILA) especially given her age and her smoking history. This could also be related to inflammatory causes like infections and organizing pneumonia.    Plan:    1. Please go ahead and complete the PET scan (scheduled for 12/4/2017).  2. Will need CT guided lung biopsy (discussed the risks associated with it)  3. Up to date with Flu vaccine as of 10/2017 and pneumonia vaccine as of 10/2015.  4. Follow up after 2-4 weeks.  5. She was counseled against smoking.  6. EKG for irregular pulse today    Sandra Flores MD  Pulmonary & Critical Care Medicine  Pager: [phone]

## 2025-03-14 NOTE — ED NOTES
ED to inpatient nurses report      Chief Complaint:  Chief Complaint   Patient presents with    Fall    Neck Pain     Present to ED from: home    MOA:     LOC: alert and orientated to name, place, date  Mobility: Fully dependent  Oxygen Baseline: none    Current needs required: none     Code Status:   Full Code    What abnormal results were found and what did you give/do to treat them? See labs, imaging.   Any procedures or intervention occur? See MAR.     Mental Status:  Level of Consciousness: Alert (0)    Psych Assessment:   Psychosocial  Psychosocial (WDL): Within Defined Limits    Vitals:  Patient Vitals for the past 24 hrs:   BP Temp Temp src Pulse Resp SpO2 Height Weight   03/13/25 1944 -- -- -- -- -- 95 % -- --   03/13/25 1913 (!) 112/55 -- -- 70 19 -- -- --   03/13/25 1900 -- -- -- -- -- 100 % -- --   03/13/25 1832 108/70 -- -- 66 15 96 % -- --   03/13/25 1716 121/80 -- -- 67 18 98 % -- --   03/13/25 1646 (!) 135/103 -- -- -- -- 99 % -- --   03/13/25 1542 108/64 -- -- 65 18 97 % -- --   03/13/25 1446 107/68 97.5 °F (36.4 °C) Oral 74 17 98 % 1.854 m (6' 1\") 97.5 kg (215 lb)        LDAs:   Peripheral IV 03/13/25 Left Forearm (Active)   Site Assessment Clean, dry & intact 03/13/25 7254       Ambulatory Status:  Presents to emergency department  because of falls (Syncope, seizure, or loss of consciousness): Yes, Age > 70: No, Altered Mental Status, Intoxication with alcohol or substance confusion (Disorientation, impaired judgment, poor safety awaremess, or inability to follow instructions): No, Impaired Mobility: Ambulates or transfers with assistive devices or assistance; Unable to ambulate or transer.: Yes, Nursing Judgement: Yes    Diagnosis:  DISPOSITION Admitted 03/13/2025 07:02:07 PM   Final diagnoses:   Acute renal failure, unspecified acute renal failure type   Fall, initial encounter   Traumatic rhabdomyolysis, initial encounter        Consults:  None     Pain Score:       C-SSRS:   Risk of Suicide:

## 2025-03-14 NOTE — H&P
ground and subsequently laid on the ground for approximately 1 day.  He did not lose consciousness.  He currently reports some pain in his neck, but denies any other significant pain.  He has not had any further loose stools.  No associated abdominal pain, nausea, vomiting, fevers, or chills.  In the emergency department he was found to have an acute kidney injury and rhabdomyolysis.  The patient does report decreased oral intake over the last couple of days as well as volume depletion from the diarrhea.  He reports his urine output has been decreased and his urine has been dark-colored.  No chest pain, cough, shortness of breath.  Uses a walker to ambulate.  No smoking or alcohol.    Review of Systems: Pertinent positives as noted in the HPI. All other systems reviewed and negative.    Medications Prior to Admission:   Prior to Admission Medications   Prescriptions Last Dose Informant Patient Reported? Taking?   atorvastatin (LIPITOR) 10 MG tablet   No No   Sig: Take 1 tablet by mouth daily At bedtime   carbidopa-levodopa (SINEMET)  MG per tablet   No No   Sig: Take 1 tablet by mouth 3 times daily   losartan-hydroCHLOROthiazide (HYZAAR) 100-25 MG per tablet   No No   Sig: Take 1 tablet by mouth daily   metoprolol succinate (TOPROL XL) 50 MG extended release tablet   No No   Sig: Take 1 tablet by mouth daily   pantoprazole (PROTONIX) 40 MG tablet   No No   Sig: Take 1 tablet by mouth daily   sertraline (ZOLOFT) 50 MG tablet   No No   Sig: Take 1 tablet by mouth daily      Facility-Administered Medications: None     Allergies:  Biaxin [clarithromycin]    Past Medical, Family, Social, Surgical Hx      Diagnosis Date    Cancer (HCC)     skin    GERD (gastroesophageal reflux disease)     Hyperlipidemia     Hypertension     Parkinson's disease (tremor, stiffness, slow motion, unstable posture) ([facility])          Procedure Laterality Date    CARDIAC CATHETERIZATION      Dr. Mckinney   early 2000   no stents    COLONOSCOPY

## 2025-03-15 PROBLEM — R53.1 GENERALIZED WEAKNESS: Status: ACTIVE | Noted: 2025-03-15

## 2025-03-15 PROBLEM — Z85.828 HISTORY OF SCC (SQUAMOUS CELL CARCINOMA) OF SKIN: Status: ACTIVE | Noted: 2025-03-15
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
## 2025-03-15 PROBLEM — R53.81 PHYSICAL DECONDITIONING: Status: ACTIVE | Noted: 2025-03-15

## 2025-03-15 PROBLEM — R79.89 ELEVATED TROPONIN: Status: ACTIVE | Noted: 2025-03-15

## 2025-03-15 PROBLEM — E87.6 HYPOKALEMIA: Status: ACTIVE | Noted: 2025-03-15

## 2025-03-15 PROBLEM — E78.5 HYPERLIPIDEMIA: Status: ACTIVE | Noted: 2022-01-15

## 2025-03-15 PROBLEM — D64.9 NORMOCYTIC ANEMIA: Status: ACTIVE | Noted: 2025-03-15

## 2025-03-15 PROBLEM — R79.89 ELEVATED LFTS: Status: ACTIVE | Noted: 2025-03-15

## 2025-03-15 PROBLEM — F32.A DEPRESSION: Status: ACTIVE | Noted: 2025-03-15

## 2025-03-15 PROBLEM — D72.829 LEUKOCYTOSIS: Status: ACTIVE | Noted: 2025-03-15

## 2025-03-15 PROBLEM — M15.0 PRIMARY OSTEOARTHRITIS INVOLVING MULTIPLE JOINTS: Status: ACTIVE | Noted: 2025-03-15

## 2025-03-15 PROBLEM — T79.6XXA TRAUMATIC RHABDOMYOLYSIS: Status: ACTIVE | Noted: 2025-03-15

## 2025-03-15 LAB
ALBUMIN SERPL BCG-MCNC: 2.9 G/DL (ref 3.4–4.9)
ALP SERPL-CCNC: 78 U/L (ref 40–129)
ALT SERPL W/O P-5'-P-CCNC: 32 U/L (ref 10–50)
AST SERPL-CCNC: 168 U/L (ref 10–50)
BASOPHILS ABSOLUTE: 0 THOU/MM3 (ref 0–0.1)
BASOPHILS NFR BLD AUTO: 0.3 %
BILIRUB CONJ SERPL-MCNC: 0.3 MG/DL (ref 0–0.2)
BILIRUB SERPL-MCNC: 0.5 MG/DL (ref 0.3–1.2)
BUN SERPL-MCNC: 31 MG/DL (ref 8–23)
CALCIUM SERPL-MCNC: 8.1 MG/DL (ref 8.8–10.2)
CHLORIDE SERPL-SCNC: 108 MEQ/L (ref 98–111)
CK SERPL-CCNC: 1704 U/L (ref 39–308)
CREAT SERPL-MCNC: 1.1 MG/DL (ref 0.7–1.2)
DEPRECATED RDW RBC AUTO: 42.5 FL (ref 35–45)
EOSINOPHIL NFR BLD AUTO: 1.9 %
EOSINOPHILS ABSOLUTE: 0.1 THOU/MM3 (ref 0–0.4)
ERYTHROCYTE [DISTWIDTH] IN BLOOD BY AUTOMATED COUNT: 14 % (ref 11.5–14.5)
GFR SERPL CREATININE-BSD FRML MDRD: 74 ML/MIN/1.73M2
GLUCOSE SERPL-MCNC: 102 MG/DL (ref 74–109)
HCT VFR BLD AUTO: 32.6 % (ref 42–52)
HGB BLD-MCNC: 11 GM/DL (ref 14–18)
IMM GRANULOCYTES # BLD AUTO: 0.01 THOU/MM3 (ref 0–0.07)
IMM GRANULOCYTES NFR BLD AUTO: 0.1 %
LYMPHOCYTES ABSOLUTE: 1.8 THOU/MM3 (ref 1–4.8)
LYMPHOCYTES NFR BLD AUTO: 26.7 %
MCH RBC QN AUTO: 28.1 PG (ref 26–33)
MCHC RBC AUTO-ENTMCNC: 33.7 GM/DL (ref 32.2–35.5)
MCV RBC AUTO: 83.4 FL (ref 80–94)
MONOCYTES ABSOLUTE: 0.6 THOU/MM3 (ref 0.4–1.3)
MONOCYTES NFR BLD AUTO: 8.5 %
NEUTROPHILS ABSOLUTE: 4.2 THOU/MM3 (ref 1.8–7.7)
NEUTROPHILS NFR BLD AUTO: 62.5 %
NRBC BLD AUTO-RTO: 0 /100 WBC
PLATELET # BLD AUTO: 164 THOU/MM3 (ref 130–400)
PMV BLD AUTO: 11.1 FL (ref 9.4–12.4)
POTASSIUM SERPL-SCNC: 3.3 MEQ/L (ref 3.5–5.2)
PROT SERPL-MCNC: 5 G/DL (ref 6.4–8.3)
RBC # BLD AUTO: 3.91 MILL/MM3 (ref 4.7–6.1)
SODIUM SERPL-SCNC: 141 MEQ/L (ref 135–145)
WBC # BLD AUTO: 6.7 THOU/MM3 (ref 4.8–10.8)

## 2025-03-15 PROCEDURE — 6360000002 HC RX W HCPCS: Performed by: PHYSICIAN ASSISTANT

## 2025-03-15 PROCEDURE — 82550 ASSAY OF CK (CPK): CPT

## 2025-03-15 PROCEDURE — 84520 ASSAY OF UREA NITROGEN: CPT

## 2025-03-15 PROCEDURE — 6370000000 HC RX 637 (ALT 250 FOR IP): Performed by: FAMILY MEDICINE

## 2025-03-15 PROCEDURE — 82435 ASSAY OF BLOOD CHLORIDE: CPT

## 2025-03-15 PROCEDURE — 2580000003 HC RX 258: Performed by: PHYSICIAN ASSISTANT

## 2025-03-15 PROCEDURE — 85025 COMPLETE CBC W/AUTO DIFF WBC: CPT

## 2025-03-15 PROCEDURE — 82947 ASSAY GLUCOSE BLOOD QUANT: CPT

## 2025-03-15 PROCEDURE — 84295 ASSAY OF SERUM SODIUM: CPT

## 2025-03-15 PROCEDURE — 99232 SBSQ HOSP IP/OBS MODERATE 35: CPT | Performed by: FAMILY MEDICINE

## 2025-03-15 PROCEDURE — 84132 ASSAY OF SERUM POTASSIUM: CPT

## 2025-03-15 PROCEDURE — 82310 ASSAY OF CALCIUM: CPT

## 2025-03-15 PROCEDURE — 82565 ASSAY OF CREATININE: CPT

## 2025-03-15 PROCEDURE — 80076 HEPATIC FUNCTION PANEL: CPT

## 2025-03-15 PROCEDURE — 6370000000 HC RX 637 (ALT 250 FOR IP): Performed by: PHYSICIAN ASSISTANT

## 2025-03-15 PROCEDURE — 1200000000 HC SEMI PRIVATE

## 2025-03-15 PROCEDURE — 1200000003 HC TELEMETRY R&B

## 2025-03-15 PROCEDURE — 36415 COLL VENOUS BLD VENIPUNCTURE: CPT

## 2025-03-15 RX ADMIN — HEPARIN SODIUM 5000 UNITS: 5000 INJECTION INTRAVENOUS; SUBCUTANEOUS at 06:07

## 2025-03-15 RX ADMIN — METOPROLOL SUCCINATE 50 MG: 50 TABLET, FILM COATED, EXTENDED RELEASE ORAL at 08:04

## 2025-03-15 RX ADMIN — ACETAMINOPHEN 650 MG: 325 TABLET ORAL at 20:51

## 2025-03-15 RX ADMIN — CARBIDOPA AND LEVODOPA 1 TABLET: 25; 100 TABLET ORAL at 08:04

## 2025-03-15 RX ADMIN — SODIUM CHLORIDE, SODIUM LACTATE, POTASSIUM CHLORIDE, AND CALCIUM CHLORIDE: .6; .31; .03; .02 INJECTION, SOLUTION INTRAVENOUS at 06:07

## 2025-03-15 RX ADMIN — PANTOPRAZOLE SODIUM 40 MG: 40 TABLET, DELAYED RELEASE ORAL at 08:04

## 2025-03-15 RX ADMIN — CARBIDOPA AND LEVODOPA 1 TABLET: 25; 100 TABLET ORAL at 14:15

## 2025-03-15 RX ADMIN — HEPARIN SODIUM 5000 UNITS: 5000 INJECTION INTRAVENOUS; SUBCUTANEOUS at 14:15

## 2025-03-15 RX ADMIN — CARBIDOPA AND LEVODOPA 1 TABLET: 25; 100 TABLET ORAL at 20:51

## 2025-03-15 RX ADMIN — SERTRALINE 50 MG: 50 TABLET, FILM COATED ORAL at 08:04

## 2025-03-15 RX ADMIN — POTASSIUM BICARBONATE 40 MEQ: 782 TABLET, EFFERVESCENT ORAL at 17:54

## 2025-03-15 RX ADMIN — HEPARIN SODIUM 5000 UNITS: 5000 INJECTION INTRAVENOUS; SUBCUTANEOUS at 20:51

## 2025-03-15 ASSESSMENT — PAIN SCALES - GENERAL
PAINLEVEL_OUTOF10: 3
PAINLEVEL_OUTOF10: 3
PAINLEVEL_OUTOF10: 1
PAINLEVEL_OUTOF10: 3

## 2025-03-15 ASSESSMENT — PAIN DESCRIPTION - LOCATION: LOCATION: KNEE

## 2025-03-16 PROBLEM — E55.9 VITAMIN D DEFICIENCY: Status: ACTIVE | Noted: 2025-03-16

## 2025-03-16 LAB
25(OH)D3 SERPL-MCNC: < 6 NG/ML (ref 30–100)
ALBUMIN SERPL BCG-MCNC: 3.2 G/DL (ref 3.4–4.9)
ALP SERPL-CCNC: 84 U/L (ref 40–129)
ALT SERPL W/O P-5'-P-CCNC: 53 U/L (ref 10–50)
ANION GAP SERPL CALC-SCNC: 10 MEQ/L (ref 8–16)
AST SERPL-CCNC: 125 U/L (ref 10–50)
BASOPHILS ABSOLUTE: 0 THOU/MM3 (ref 0–0.1)
BASOPHILS NFR BLD AUTO: 0.3 %
BILIRUB CONJ SERPL-MCNC: 0.3 MG/DL (ref 0–0.2)
BILIRUB SERPL-MCNC: 0.5 MG/DL (ref 0.3–1.2)
BUN SERPL-MCNC: 20 MG/DL (ref 8–23)
CALCIUM SERPL-MCNC: 8.2 MG/DL (ref 8.8–10.2)
CHLORIDE SERPL-SCNC: 106 MEQ/L (ref 98–111)
CK SERPL-CCNC: 790 U/L (ref 39–308)
CO2 SERPL-SCNC: 27 MEQ/L (ref 22–29)
CREAT SERPL-MCNC: 1 MG/DL (ref 0.7–1.2)
DEPRECATED RDW RBC AUTO: 41.1 FL (ref 35–45)
EOSINOPHIL NFR BLD AUTO: 1.9 %
EOSINOPHILS ABSOLUTE: 0.1 THOU/MM3 (ref 0–0.4)
ERYTHROCYTE [DISTWIDTH] IN BLOOD BY AUTOMATED COUNT: 13.6 % (ref 11.5–14.5)
FOLATE SERPL-MCNC: 6.3 NG/ML (ref 4.6–34.8)
GFR SERPL CREATININE-BSD FRML MDRD: 83 ML/MIN/1.73M2
GLUCOSE SERPL-MCNC: 95 MG/DL (ref 74–109)
HCT VFR BLD AUTO: 34.8 % (ref 42–52)
HGB BLD-MCNC: 11.8 GM/DL (ref 14–18)
IMM GRANULOCYTES # BLD AUTO: 0.02 THOU/MM3 (ref 0–0.07)
IMM GRANULOCYTES NFR BLD AUTO: 0.3 %
LYMPHOCYTES ABSOLUTE: 1.6 THOU/MM3 (ref 1–4.8)
LYMPHOCYTES NFR BLD AUTO: 25.6 %
MAGNESIUM SERPL-MCNC: 1.5 MG/DL (ref 1.6–2.6)
MCH RBC QN AUTO: 28.2 PG (ref 26–33)
MCHC RBC AUTO-ENTMCNC: 33.9 GM/DL (ref 32.2–35.5)
MCV RBC AUTO: 83.1 FL (ref 80–94)
MONOCYTES ABSOLUTE: 0.5 THOU/MM3 (ref 0.4–1.3)
MONOCYTES NFR BLD AUTO: 7.5 %
MYOGLOBIN UR-MCNC: < 1 MG/L (ref 0–1)
NEUTROPHILS ABSOLUTE: 4.1 THOU/MM3 (ref 1.8–7.7)
NEUTROPHILS NFR BLD AUTO: 64.4 %
NRBC BLD AUTO-RTO: 0 /100 WBC
PLATELET # BLD AUTO: 172 THOU/MM3 (ref 130–400)
PMV BLD AUTO: 11.2 FL (ref 9.4–12.4)
POTASSIUM SERPL-SCNC: 3.5 MEQ/L (ref 3.5–5.2)
PROT SERPL-MCNC: 5.4 G/DL (ref 6.4–8.3)
RBC # BLD AUTO: 4.19 MILL/MM3 (ref 4.7–6.1)
SODIUM SERPL-SCNC: 143 MEQ/L (ref 135–145)
VIT B12 SERPL-MCNC: 260 PG/ML (ref 232–1245)
WBC # BLD AUTO: 6.3 THOU/MM3 (ref 4.8–10.8)

## 2025-03-16 PROCEDURE — 6370000000 HC RX 637 (ALT 250 FOR IP): Performed by: PHYSICIAN ASSISTANT

## 2025-03-16 PROCEDURE — 80053 COMPREHEN METABOLIC PANEL: CPT

## 2025-03-16 PROCEDURE — 82746 ASSAY OF FOLIC ACID SERUM: CPT

## 2025-03-16 PROCEDURE — 83735 ASSAY OF MAGNESIUM: CPT

## 2025-03-16 PROCEDURE — 82306 VITAMIN D 25 HYDROXY: CPT

## 2025-03-16 PROCEDURE — 6360000002 HC RX W HCPCS: Performed by: PHYSICIAN ASSISTANT

## 2025-03-16 PROCEDURE — 2580000003 HC RX 258: Performed by: FAMILY MEDICINE

## 2025-03-16 PROCEDURE — 36415 COLL VENOUS BLD VENIPUNCTURE: CPT

## 2025-03-16 PROCEDURE — 2500000003 HC RX 250 WO HCPCS: Performed by: PHYSICIAN ASSISTANT

## 2025-03-16 PROCEDURE — 82550 ASSAY OF CK (CPK): CPT

## 2025-03-16 PROCEDURE — 85025 COMPLETE CBC W/AUTO DIFF WBC: CPT

## 2025-03-16 PROCEDURE — 99232 SBSQ HOSP IP/OBS MODERATE 35: CPT | Performed by: FAMILY MEDICINE

## 2025-03-16 PROCEDURE — 6370000000 HC RX 637 (ALT 250 FOR IP): Performed by: FAMILY MEDICINE

## 2025-03-16 PROCEDURE — 1200000000 HC SEMI PRIVATE

## 2025-03-16 PROCEDURE — 82607 VITAMIN B-12: CPT

## 2025-03-16 PROCEDURE — 6360000002 HC RX W HCPCS: Performed by: FAMILY MEDICINE

## 2025-03-16 PROCEDURE — 82248 BILIRUBIN DIRECT: CPT

## 2025-03-16 RX ORDER — ERGOCALCIFEROL 1.25 MG/1
50000 CAPSULE, LIQUID FILLED ORAL WEEKLY
Status: DISCONTINUED | OUTPATIENT
Start: 2025-03-17 | End: 2025-03-20 | Stop reason: HOSPADM

## 2025-03-16 RX ADMIN — POTASSIUM BICARBONATE 40 MEQ: 782 TABLET, EFFERVESCENT ORAL at 11:13

## 2025-03-16 RX ADMIN — METOPROLOL SUCCINATE 50 MG: 50 TABLET, FILM COATED, EXTENDED RELEASE ORAL at 08:51

## 2025-03-16 RX ADMIN — HEPARIN SODIUM 5000 UNITS: 5000 INJECTION INTRAVENOUS; SUBCUTANEOUS at 22:24

## 2025-03-16 RX ADMIN — SODIUM CHLORIDE, PRESERVATIVE FREE 10 ML: 5 INJECTION INTRAVENOUS at 22:19

## 2025-03-16 RX ADMIN — PANTOPRAZOLE SODIUM 40 MG: 40 TABLET, DELAYED RELEASE ORAL at 08:51

## 2025-03-16 RX ADMIN — HEPARIN SODIUM 5000 UNITS: 5000 INJECTION INTRAVENOUS; SUBCUTANEOUS at 13:41

## 2025-03-16 RX ADMIN — HEPARIN SODIUM 5000 UNITS: 5000 INJECTION INTRAVENOUS; SUBCUTANEOUS at 04:49

## 2025-03-16 RX ADMIN — MAGNESIUM SULFATE HEPTAHYDRATE 2000 MG: 40 INJECTION, SOLUTION INTRAVENOUS at 11:10

## 2025-03-16 RX ADMIN — CARBIDOPA AND LEVODOPA 1 TABLET: 25; 100 TABLET ORAL at 08:51

## 2025-03-16 RX ADMIN — SODIUM CHLORIDE, SODIUM LACTATE, POTASSIUM CHLORIDE, AND CALCIUM CHLORIDE: .6; .31; .03; .02 INJECTION, SOLUTION INTRAVENOUS at 04:50

## 2025-03-16 RX ADMIN — SERTRALINE 50 MG: 50 TABLET, FILM COATED ORAL at 08:51

## 2025-03-16 RX ADMIN — CARBIDOPA AND LEVODOPA 1 TABLET: 25; 100 TABLET ORAL at 13:41

## 2025-03-16 RX ADMIN — CARBIDOPA AND LEVODOPA 1 TABLET: 25; 100 TABLET ORAL at 22:22

## 2025-03-16 ASSESSMENT — PAIN SCALES - GENERAL: PAINLEVEL_OUTOF10: 2

## 2025-03-17 PROBLEM — E83.39 HYPOPHOSPHATEMIA: Status: ACTIVE | Noted: 2025-03-17

## 2025-03-17 LAB
ALBUMIN SERPL BCG-MCNC: 3.1 G/DL (ref 3.4–4.9)
ALP SERPL-CCNC: 87 U/L (ref 40–129)
ALT SERPL W/O P-5'-P-CCNC: 26 U/L (ref 10–50)
ANION GAP SERPL CALC-SCNC: 10 MEQ/L (ref 8–16)
AST SERPL-CCNC: 91 U/L (ref 10–50)
BASOPHILS ABSOLUTE: 0 THOU/MM3 (ref 0–0.1)
BASOPHILS NFR BLD AUTO: 0.3 %
BILIRUB SERPL-MCNC: 0.4 MG/DL (ref 0.3–1.2)
BUN SERPL-MCNC: 20 MG/DL (ref 8–23)
CALCIUM SERPL-MCNC: 8.3 MG/DL (ref 8.8–10.2)
CHLORIDE SERPL-SCNC: 106 MEQ/L (ref 98–111)
CK SERPL-CCNC: 407 U/L (ref 39–308)
CO2 SERPL-SCNC: 25 MEQ/L (ref 22–29)
CREAT SERPL-MCNC: 0.9 MG/DL (ref 0.7–1.2)
DEPRECATED RDW RBC AUTO: 40.5 FL (ref 35–45)
EOSINOPHIL NFR BLD AUTO: 2 %
EOSINOPHILS ABSOLUTE: 0.1 THOU/MM3 (ref 0–0.4)
ERYTHROCYTE [DISTWIDTH] IN BLOOD BY AUTOMATED COUNT: 13.7 % (ref 11.5–14.5)
GFR SERPL CREATININE-BSD FRML MDRD: > 90 ML/MIN/1.73M2
GLUCOSE SERPL-MCNC: 98 MG/DL (ref 74–109)
HCT VFR BLD AUTO: 35 % (ref 42–52)
HGB BLD-MCNC: 12 GM/DL (ref 14–18)
IMM GRANULOCYTES # BLD AUTO: 0.02 THOU/MM3 (ref 0–0.07)
IMM GRANULOCYTES NFR BLD AUTO: 0.3 %
LYMPHOCYTES ABSOLUTE: 1.7 THOU/MM3 (ref 1–4.8)
LYMPHOCYTES NFR BLD AUTO: 24.9 %
MAGNESIUM SERPL-MCNC: 1.6 MG/DL (ref 1.6–2.6)
MCH RBC QN AUTO: 28.2 PG (ref 26–33)
MCHC RBC AUTO-ENTMCNC: 34.3 GM/DL (ref 32.2–35.5)
MCV RBC AUTO: 82.4 FL (ref 80–94)
MONOCYTES ABSOLUTE: 0.5 THOU/MM3 (ref 0.4–1.3)
MONOCYTES NFR BLD AUTO: 7.7 %
NEUTROPHILS ABSOLUTE: 4.5 THOU/MM3 (ref 1.8–7.7)
NEUTROPHILS NFR BLD AUTO: 64.8 %
NRBC BLD AUTO-RTO: 0 /100 WBC
PHOSPHATE SERPL-MCNC: 2.2 MG/DL (ref 2.5–4.5)
PLATELET # BLD AUTO: 172 THOU/MM3 (ref 130–400)
PMV BLD AUTO: 10.9 FL (ref 9.4–12.4)
POTASSIUM SERPL-SCNC: 4 MEQ/L (ref 3.5–5.2)
PROT SERPL-MCNC: 5.5 G/DL (ref 6.4–8.3)
RBC # BLD AUTO: 4.25 MILL/MM3 (ref 4.7–6.1)
SODIUM SERPL-SCNC: 141 MEQ/L (ref 135–145)
WBC # BLD AUTO: 7 THOU/MM3 (ref 4.8–10.8)

## 2025-03-17 PROCEDURE — 36415 COLL VENOUS BLD VENIPUNCTURE: CPT

## 2025-03-17 PROCEDURE — 97530 THERAPEUTIC ACTIVITIES: CPT

## 2025-03-17 PROCEDURE — 85025 COMPLETE CBC W/AUTO DIFF WBC: CPT

## 2025-03-17 PROCEDURE — 6370000000 HC RX 637 (ALT 250 FOR IP): Performed by: PHYSICIAN ASSISTANT

## 2025-03-17 PROCEDURE — 99232 SBSQ HOSP IP/OBS MODERATE 35: CPT | Performed by: STUDENT IN AN ORGANIZED HEALTH CARE EDUCATION/TRAINING PROGRAM

## 2025-03-17 PROCEDURE — 97110 THERAPEUTIC EXERCISES: CPT

## 2025-03-17 PROCEDURE — 97535 SELF CARE MNGMENT TRAINING: CPT

## 2025-03-17 PROCEDURE — 6360000002 HC RX W HCPCS: Performed by: PHYSICIAN ASSISTANT

## 2025-03-17 PROCEDURE — 1200000000 HC SEMI PRIVATE

## 2025-03-17 PROCEDURE — 84100 ASSAY OF PHOSPHORUS: CPT

## 2025-03-17 PROCEDURE — 80053 COMPREHEN METABOLIC PANEL: CPT

## 2025-03-17 PROCEDURE — 6370000000 HC RX 637 (ALT 250 FOR IP): Performed by: FAMILY MEDICINE

## 2025-03-17 PROCEDURE — 97162 PT EVAL MOD COMPLEX 30 MIN: CPT

## 2025-03-17 PROCEDURE — 83735 ASSAY OF MAGNESIUM: CPT

## 2025-03-17 PROCEDURE — 99232 SBSQ HOSP IP/OBS MODERATE 35: CPT | Performed by: FAMILY MEDICINE

## 2025-03-17 PROCEDURE — 2500000003 HC RX 250 WO HCPCS: Performed by: PHYSICIAN ASSISTANT

## 2025-03-17 PROCEDURE — 82550 ASSAY OF CK (CPK): CPT

## 2025-03-17 RX ADMIN — HEPARIN SODIUM 5000 UNITS: 5000 INJECTION INTRAVENOUS; SUBCUTANEOUS at 05:43

## 2025-03-17 RX ADMIN — HEPARIN SODIUM 5000 UNITS: 5000 INJECTION INTRAVENOUS; SUBCUTANEOUS at 22:06

## 2025-03-17 RX ADMIN — CARBIDOPA AND LEVODOPA 1 TABLET: 25; 100 TABLET ORAL at 20:07

## 2025-03-17 RX ADMIN — PANTOPRAZOLE SODIUM 40 MG: 40 TABLET, DELAYED RELEASE ORAL at 09:47

## 2025-03-17 RX ADMIN — SODIUM CHLORIDE, PRESERVATIVE FREE 10 ML: 5 INJECTION INTRAVENOUS at 20:07

## 2025-03-17 RX ADMIN — SERTRALINE 50 MG: 50 TABLET, FILM COATED ORAL at 09:47

## 2025-03-17 RX ADMIN — METOPROLOL SUCCINATE 50 MG: 50 TABLET, FILM COATED, EXTENDED RELEASE ORAL at 09:47

## 2025-03-17 RX ADMIN — CARBIDOPA AND LEVODOPA 1 TABLET: 25; 100 TABLET ORAL at 14:17

## 2025-03-17 RX ADMIN — ERGOCALCIFEROL 50000 UNITS: 1.25 CAPSULE ORAL at 09:47

## 2025-03-17 RX ADMIN — SODIUM CHLORIDE, PRESERVATIVE FREE 10 ML: 5 INJECTION INTRAVENOUS at 09:47

## 2025-03-17 RX ADMIN — HEPARIN SODIUM 5000 UNITS: 5000 INJECTION INTRAVENOUS; SUBCUTANEOUS at 14:17

## 2025-03-17 RX ADMIN — CARBIDOPA AND LEVODOPA 1 TABLET: 25; 100 TABLET ORAL at 09:47

## 2025-03-18 LAB
ALBUMIN SERPL BCG-MCNC: 3.2 G/DL (ref 3.4–4.9)
ALP SERPL-CCNC: 91 U/L (ref 40–129)
ALT SERPL W/O P-5'-P-CCNC: 39 U/L (ref 10–50)
ANION GAP SERPL CALC-SCNC: 8 MEQ/L (ref 8–16)
AST SERPL-CCNC: 78 U/L (ref 10–50)
BASOPHILS ABSOLUTE: 0 THOU/MM3 (ref 0–0.1)
BASOPHILS NFR BLD AUTO: 0.4 %
BILIRUB SERPL-MCNC: 0.3 MG/DL (ref 0.3–1.2)
BUN SERPL-MCNC: 21 MG/DL (ref 8–23)
CALCIUM SERPL-MCNC: 8.7 MG/DL (ref 8.8–10.2)
CHLORIDE SERPL-SCNC: 104 MEQ/L (ref 98–111)
CO2 SERPL-SCNC: 28 MEQ/L (ref 22–29)
CREAT SERPL-MCNC: 0.9 MG/DL (ref 0.7–1.2)
DEPRECATED RDW RBC AUTO: 41.3 FL (ref 35–45)
EOSINOPHIL NFR BLD AUTO: 2.7 %
EOSINOPHILS ABSOLUTE: 0.2 THOU/MM3 (ref 0–0.4)
ERYTHROCYTE [DISTWIDTH] IN BLOOD BY AUTOMATED COUNT: 13.7 % (ref 11.5–14.5)
GFR SERPL CREATININE-BSD FRML MDRD: > 90 ML/MIN/1.73M2
GLUCOSE BLD STRIP.AUTO-MCNC: 88 MG/DL (ref 70–108)
GLUCOSE SERPL-MCNC: 99 MG/DL (ref 74–109)
HCT VFR BLD AUTO: 35.3 % (ref 42–52)
HGB BLD-MCNC: 12 GM/DL (ref 14–18)
IMM GRANULOCYTES # BLD AUTO: 0.04 THOU/MM3 (ref 0–0.07)
IMM GRANULOCYTES NFR BLD AUTO: 0.6 %
LYMPHOCYTES ABSOLUTE: 1.8 THOU/MM3 (ref 1–4.8)
LYMPHOCYTES NFR BLD AUTO: 25.6 %
MAGNESIUM SERPL-MCNC: 1.7 MG/DL (ref 1.6–2.6)
MCH RBC QN AUTO: 28.2 PG (ref 26–33)
MCHC RBC AUTO-ENTMCNC: 34 GM/DL (ref 32.2–35.5)
MCV RBC AUTO: 83.1 FL (ref 80–94)
MONOCYTES ABSOLUTE: 0.6 THOU/MM3 (ref 0.4–1.3)
MONOCYTES NFR BLD AUTO: 8.1 %
NEUTROPHILS ABSOLUTE: 4.3 THOU/MM3 (ref 1.8–7.7)
NEUTROPHILS NFR BLD AUTO: 62.6 %
NRBC BLD AUTO-RTO: 0 /100 WBC
PHOSPHATE SERPL-MCNC: 2.3 MG/DL (ref 2.5–4.5)
PLATELET # BLD AUTO: 171 THOU/MM3 (ref 130–400)
PMV BLD AUTO: 10.9 FL (ref 9.4–12.4)
POTASSIUM SERPL-SCNC: 3.6 MEQ/L (ref 3.5–5.2)
PROT SERPL-MCNC: 5.6 G/DL (ref 6.4–8.3)
RBC # BLD AUTO: 4.25 MILL/MM3 (ref 4.7–6.1)
SODIUM SERPL-SCNC: 140 MEQ/L (ref 135–145)
WBC # BLD AUTO: 6.9 THOU/MM3 (ref 4.8–10.8)

## 2025-03-18 PROCEDURE — 2580000003 HC RX 258: Performed by: FAMILY MEDICINE

## 2025-03-18 PROCEDURE — 97530 THERAPEUTIC ACTIVITIES: CPT

## 2025-03-18 PROCEDURE — 6370000000 HC RX 637 (ALT 250 FOR IP): Performed by: PHYSICIAN ASSISTANT

## 2025-03-18 PROCEDURE — 85025 COMPLETE CBC W/AUTO DIFF WBC: CPT

## 2025-03-18 PROCEDURE — 80053 COMPREHEN METABOLIC PANEL: CPT

## 2025-03-18 PROCEDURE — 83735 ASSAY OF MAGNESIUM: CPT

## 2025-03-18 PROCEDURE — 99232 SBSQ HOSP IP/OBS MODERATE 35: CPT | Performed by: FAMILY MEDICINE

## 2025-03-18 PROCEDURE — 1200000000 HC SEMI PRIVATE

## 2025-03-18 PROCEDURE — 2500000003 HC RX 250 WO HCPCS: Performed by: FAMILY MEDICINE

## 2025-03-18 PROCEDURE — 36415 COLL VENOUS BLD VENIPUNCTURE: CPT

## 2025-03-18 PROCEDURE — 82948 REAGENT STRIP/BLOOD GLUCOSE: CPT

## 2025-03-18 PROCEDURE — 6360000002 HC RX W HCPCS: Performed by: PHYSICIAN ASSISTANT

## 2025-03-18 PROCEDURE — 84100 ASSAY OF PHOSPHORUS: CPT

## 2025-03-18 PROCEDURE — 97535 SELF CARE MNGMENT TRAINING: CPT

## 2025-03-18 PROCEDURE — 6370000000 HC RX 637 (ALT 250 FOR IP)

## 2025-03-18 PROCEDURE — 2500000003 HC RX 250 WO HCPCS: Performed by: PHYSICIAN ASSISTANT

## 2025-03-18 PROCEDURE — 97112 NEUROMUSCULAR REEDUCATION: CPT

## 2025-03-18 RX ORDER — HYDROCHLOROTHIAZIDE 25 MG/1
25 TABLET ORAL DAILY
Status: DISCONTINUED | OUTPATIENT
Start: 2025-03-18 | End: 2025-03-18

## 2025-03-18 RX ORDER — LOSARTAN POTASSIUM 100 MG/1
100 TABLET ORAL DAILY
Status: DISCONTINUED | OUTPATIENT
Start: 2025-03-18 | End: 2025-03-20 | Stop reason: HOSPADM

## 2025-03-18 RX ORDER — ATORVASTATIN CALCIUM 10 MG/1
10 TABLET, FILM COATED ORAL DAILY
Status: DISCONTINUED | OUTPATIENT
Start: 2025-03-18 | End: 2025-03-20 | Stop reason: HOSPADM

## 2025-03-18 RX ADMIN — HEPARIN SODIUM 5000 UNITS: 5000 INJECTION INTRAVENOUS; SUBCUTANEOUS at 20:26

## 2025-03-18 RX ADMIN — CARBIDOPA AND LEVODOPA 1 TABLET: 25; 100 TABLET ORAL at 14:37

## 2025-03-18 RX ADMIN — SODIUM CHLORIDE, PRESERVATIVE FREE 10 ML: 5 INJECTION INTRAVENOUS at 09:35

## 2025-03-18 RX ADMIN — SODIUM PHOSPHATE, MONOBASIC, MONOHYDRATE AND SODIUM PHOSPHATE, DIBASIC, ANHYDROUS 15 MMOL: 276; 142 INJECTION, SOLUTION INTRAVENOUS at 15:39

## 2025-03-18 RX ADMIN — SERTRALINE 50 MG: 50 TABLET, FILM COATED ORAL at 09:28

## 2025-03-18 RX ADMIN — SODIUM CHLORIDE, PRESERVATIVE FREE 10 ML: 5 INJECTION INTRAVENOUS at 20:27

## 2025-03-18 RX ADMIN — CARBIDOPA AND LEVODOPA 1 TABLET: 25; 100 TABLET ORAL at 09:10

## 2025-03-18 RX ADMIN — LOSARTAN POTASSIUM 100 MG: 100 TABLET, FILM COATED ORAL at 12:00

## 2025-03-18 RX ADMIN — CARBIDOPA AND LEVODOPA 1 TABLET: 25; 100 TABLET ORAL at 20:27

## 2025-03-18 RX ADMIN — HYDROCHLOROTHIAZIDE 25 MG: 25 TABLET ORAL at 11:59

## 2025-03-18 RX ADMIN — ATORVASTATIN CALCIUM 10 MG: 10 TABLET, FILM COATED ORAL at 12:00

## 2025-03-18 RX ADMIN — HEPARIN SODIUM 5000 UNITS: 5000 INJECTION INTRAVENOUS; SUBCUTANEOUS at 06:29

## 2025-03-18 RX ADMIN — PANTOPRAZOLE SODIUM 40 MG: 40 TABLET, DELAYED RELEASE ORAL at 09:30

## 2025-03-18 RX ADMIN — HEPARIN SODIUM 5000 UNITS: 5000 INJECTION INTRAVENOUS; SUBCUTANEOUS at 14:37

## 2025-03-18 ASSESSMENT — PAIN SCALES - GENERAL
PAINLEVEL_OUTOF10: 3
PAINLEVEL_OUTOF10: 0
PAINLEVEL_OUTOF10: 0

## 2025-03-18 ASSESSMENT — PAIN DESCRIPTION - LOCATION: LOCATION: EYE

## 2025-03-18 ASSESSMENT — PAIN DESCRIPTION - DESCRIPTORS: DESCRIPTORS: BURNING

## 2025-03-18 NOTE — DISCHARGE INSTR - COC
[place] [address]             Contact Information    [phone]        Phone:  Fax:    Dialysis Facility (if applicable)   Name:  Address:  Dialysis Schedule:  Phone:  Fax:    / signature: Electronically signed by JIMENEZ Interiano, LSW on 3/18/25 at 2:01 PM EDT    PHYSICIAN SECTION    Prognosis: Good    Condition at Discharge: Stable    Rehab Potential (if transferring to Rehab): Good    Recommended Labs or Other Treatments After Discharge: -    Physician Certification: I certify the above information and transfer of Timothy J Luersman  is necessary for the continuing treatment of the diagnosis listed and that he requires Skilled Nursing Facility for greater 30 days.     Update Admission H&P: No change in H&P    PHYSICIAN SIGNATURE:  Electronically signed by Alma Topete MD on 3/20/25 at 8:37 AM EDT

## 2025-03-19 LAB
ALBUMIN SERPL BCG-MCNC: 3.5 G/DL (ref 3.4–4.9)
ALP SERPL-CCNC: 100 U/L (ref 40–129)
ALT SERPL W/O P-5'-P-CCNC: 52 U/L (ref 10–50)
ANION GAP SERPL CALC-SCNC: 8 MEQ/L (ref 8–16)
AST SERPL-CCNC: 74 U/L (ref 10–50)
BASOPHILS ABSOLUTE: 0 THOU/MM3 (ref 0–0.1)
BASOPHILS NFR BLD AUTO: 0.4 %
BILIRUB SERPL-MCNC: 0.4 MG/DL (ref 0.3–1.2)
BUN SERPL-MCNC: 16 MG/DL (ref 8–23)
CALCIUM SERPL-MCNC: 9.2 MG/DL (ref 8.8–10.2)
CHLORIDE SERPL-SCNC: 103 MEQ/L (ref 98–111)
CO2 SERPL-SCNC: 30 MEQ/L (ref 22–29)
CREAT SERPL-MCNC: 0.9 MG/DL (ref 0.7–1.2)
DEPRECATED RDW RBC AUTO: 42.4 FL (ref 35–45)
EOSINOPHIL NFR BLD AUTO: 2.5 %
EOSINOPHILS ABSOLUTE: 0.2 THOU/MM3 (ref 0–0.4)
ERYTHROCYTE [DISTWIDTH] IN BLOOD BY AUTOMATED COUNT: 14 % (ref 11.5–14.5)
GFR SERPL CREATININE-BSD FRML MDRD: > 90 ML/MIN/1.73M2
GLUCOSE SERPL-MCNC: 100 MG/DL (ref 74–109)
HCT VFR BLD AUTO: 38.6 % (ref 42–52)
HGB BLD-MCNC: 12.9 GM/DL (ref 14–18)
IMM GRANULOCYTES # BLD AUTO: 0.06 THOU/MM3 (ref 0–0.07)
IMM GRANULOCYTES NFR BLD AUTO: 0.9 %
LYMPHOCYTES ABSOLUTE: 1.1 THOU/MM3 (ref 1–4.8)
LYMPHOCYTES NFR BLD AUTO: 16.9 %
MAGNESIUM SERPL-MCNC: 1.8 MG/DL (ref 1.6–2.6)
MCH RBC QN AUTO: 28.1 PG (ref 26–33)
MCHC RBC AUTO-ENTMCNC: 33.4 GM/DL (ref 32.2–35.5)
MCV RBC AUTO: 84.1 FL (ref 80–94)
MONOCYTES ABSOLUTE: 0.5 THOU/MM3 (ref 0.4–1.3)
MONOCYTES NFR BLD AUTO: 7.9 %
NEUTROPHILS ABSOLUTE: 4.8 THOU/MM3 (ref 1.8–7.7)
NEUTROPHILS NFR BLD AUTO: 71.4 %
NRBC BLD AUTO-RTO: 0 /100 WBC
PHOSPHATE SERPL-MCNC: 3 MG/DL (ref 2.5–4.5)
PLATELET # BLD AUTO: 199 THOU/MM3 (ref 130–400)
PMV BLD AUTO: 11 FL (ref 9.4–12.4)
POTASSIUM SERPL-SCNC: 3.9 MEQ/L (ref 3.5–5.2)
PROT SERPL-MCNC: 6.2 G/DL (ref 6.4–8.3)
RBC # BLD AUTO: 4.59 MILL/MM3 (ref 4.7–6.1)
SODIUM SERPL-SCNC: 141 MEQ/L (ref 135–145)
WBC # BLD AUTO: 6.7 THOU/MM3 (ref 4.8–10.8)

## 2025-03-19 PROCEDURE — 6370000000 HC RX 637 (ALT 250 FOR IP): Performed by: PHYSICIAN ASSISTANT

## 2025-03-19 PROCEDURE — 97530 THERAPEUTIC ACTIVITIES: CPT

## 2025-03-19 PROCEDURE — 83735 ASSAY OF MAGNESIUM: CPT

## 2025-03-19 PROCEDURE — 6370000000 HC RX 637 (ALT 250 FOR IP)

## 2025-03-19 PROCEDURE — 36415 COLL VENOUS BLD VENIPUNCTURE: CPT

## 2025-03-19 PROCEDURE — 2500000003 HC RX 250 WO HCPCS: Performed by: PHYSICIAN ASSISTANT

## 2025-03-19 PROCEDURE — 97535 SELF CARE MNGMENT TRAINING: CPT

## 2025-03-19 PROCEDURE — 99233 SBSQ HOSP IP/OBS HIGH 50: CPT | Performed by: STUDENT IN AN ORGANIZED HEALTH CARE EDUCATION/TRAINING PROGRAM

## 2025-03-19 PROCEDURE — 85025 COMPLETE CBC W/AUTO DIFF WBC: CPT

## 2025-03-19 PROCEDURE — 1200000000 HC SEMI PRIVATE

## 2025-03-19 PROCEDURE — 80053 COMPREHEN METABOLIC PANEL: CPT

## 2025-03-19 PROCEDURE — 84100 ASSAY OF PHOSPHORUS: CPT

## 2025-03-19 PROCEDURE — 6370000000 HC RX 637 (ALT 250 FOR IP): Performed by: STUDENT IN AN ORGANIZED HEALTH CARE EDUCATION/TRAINING PROGRAM

## 2025-03-19 PROCEDURE — 6360000002 HC RX W HCPCS: Performed by: PHYSICIAN ASSISTANT

## 2025-03-19 RX ORDER — HYDROCHLOROTHIAZIDE 25 MG/1
25 TABLET ORAL DAILY
Status: DISCONTINUED | OUTPATIENT
Start: 2025-03-19 | End: 2025-03-20 | Stop reason: HOSPADM

## 2025-03-19 RX ADMIN — HEPARIN SODIUM 5000 UNITS: 5000 INJECTION INTRAVENOUS; SUBCUTANEOUS at 05:34

## 2025-03-19 RX ADMIN — METOPROLOL SUCCINATE 50 MG: 50 TABLET, FILM COATED, EXTENDED RELEASE ORAL at 09:12

## 2025-03-19 RX ADMIN — CARBIDOPA AND LEVODOPA 1 TABLET: 25; 100 TABLET ORAL at 09:12

## 2025-03-19 RX ADMIN — HYDROCHLOROTHIAZIDE 25 MG: 25 TABLET ORAL at 15:37

## 2025-03-19 RX ADMIN — ATORVASTATIN CALCIUM 10 MG: 10 TABLET, FILM COATED ORAL at 09:12

## 2025-03-19 RX ADMIN — CARBIDOPA AND LEVODOPA 1 TABLET: 25; 100 TABLET ORAL at 21:10

## 2025-03-19 RX ADMIN — ONDANSETRON 4 MG: 4 TABLET, ORALLY DISINTEGRATING ORAL at 18:32

## 2025-03-19 RX ADMIN — HEPARIN SODIUM 5000 UNITS: 5000 INJECTION INTRAVENOUS; SUBCUTANEOUS at 21:10

## 2025-03-19 RX ADMIN — SODIUM CHLORIDE, PRESERVATIVE FREE 10 ML: 5 INJECTION INTRAVENOUS at 09:31

## 2025-03-19 RX ADMIN — CARBIDOPA AND LEVODOPA 1 TABLET: 25; 100 TABLET ORAL at 13:56

## 2025-03-19 RX ADMIN — HEPARIN SODIUM 5000 UNITS: 5000 INJECTION INTRAVENOUS; SUBCUTANEOUS at 13:55

## 2025-03-19 RX ADMIN — Medication 4.8 MG: at 21:10

## 2025-03-19 RX ADMIN — SERTRALINE 50 MG: 50 TABLET, FILM COATED ORAL at 09:12

## 2025-03-19 RX ADMIN — LOSARTAN POTASSIUM 100 MG: 100 TABLET, FILM COATED ORAL at 09:12

## 2025-03-19 RX ADMIN — SODIUM CHLORIDE, PRESERVATIVE FREE 10 ML: 5 INJECTION INTRAVENOUS at 21:10

## 2025-03-19 RX ADMIN — PANTOPRAZOLE SODIUM 40 MG: 40 TABLET, DELAYED RELEASE ORAL at 09:12

## 2025-03-19 ASSESSMENT — PAIN SCALES - GENERAL
PAINLEVEL_OUTOF10: 0
PAINLEVEL_OUTOF10: 0
PAINLEVEL_OUTOF10: 1

## 2025-03-19 ASSESSMENT — PAIN DESCRIPTION - LOCATION: LOCATION: THROAT

## 2025-03-19 ASSESSMENT — PAIN DESCRIPTION - DESCRIPTORS: DESCRIPTORS: SORE

## 2025-03-20 VITALS
TEMPERATURE: 97.8 F | DIASTOLIC BLOOD PRESSURE: 60 MMHG | WEIGHT: 211.2 LBS | SYSTOLIC BLOOD PRESSURE: 96 MMHG | BODY MASS INDEX: 27.99 KG/M2 | HEIGHT: 73 IN | RESPIRATION RATE: 16 BRPM | OXYGEN SATURATION: 95 % | HEART RATE: 59 BPM

## 2025-03-20 LAB
ALBUMIN SERPL BCG-MCNC: 3.4 G/DL (ref 3.4–4.9)
ALP SERPL-CCNC: 100 U/L (ref 40–129)
ALT SERPL W/O P-5'-P-CCNC: 27 U/L (ref 10–50)
ANION GAP SERPL CALC-SCNC: 9 MEQ/L (ref 8–16)
AST SERPL-CCNC: 50 U/L (ref 10–50)
BASOPHILS ABSOLUTE: 0 THOU/MM3 (ref 0–0.1)
BASOPHILS NFR BLD AUTO: 0.2 %
BILIRUB SERPL-MCNC: 0.4 MG/DL (ref 0.3–1.2)
BUN SERPL-MCNC: 23 MG/DL (ref 8–23)
CALCIUM SERPL-MCNC: 8.7 MG/DL (ref 8.8–10.2)
CHLORIDE SERPL-SCNC: 103 MEQ/L (ref 98–111)
CO2 SERPL-SCNC: 27 MEQ/L (ref 22–29)
CREAT SERPL-MCNC: 1 MG/DL (ref 0.7–1.2)
DEPRECATED RDW RBC AUTO: 42.5 FL (ref 35–45)
EOSINOPHIL NFR BLD AUTO: 1.8 %
EOSINOPHILS ABSOLUTE: 0.1 THOU/MM3 (ref 0–0.4)
ERYTHROCYTE [DISTWIDTH] IN BLOOD BY AUTOMATED COUNT: 14.1 % (ref 11.5–14.5)
GFR SERPL CREATININE-BSD FRML MDRD: 83 ML/MIN/1.73M2
GLUCOSE SERPL-MCNC: 95 MG/DL (ref 74–109)
HCT VFR BLD AUTO: 36.7 % (ref 42–52)
HGB BLD-MCNC: 12.4 GM/DL (ref 14–18)
IMM GRANULOCYTES # BLD AUTO: 0.04 THOU/MM3 (ref 0–0.07)
IMM GRANULOCYTES NFR BLD AUTO: 0.7 %
LYMPHOCYTES ABSOLUTE: 0.9 THOU/MM3 (ref 1–4.8)
LYMPHOCYTES NFR BLD AUTO: 16.4 %
MCH RBC QN AUTO: 28.2 PG (ref 26–33)
MCHC RBC AUTO-ENTMCNC: 33.8 GM/DL (ref 32.2–35.5)
MCV RBC AUTO: 83.4 FL (ref 80–94)
MONOCYTES ABSOLUTE: 0.4 THOU/MM3 (ref 0.4–1.3)
MONOCYTES NFR BLD AUTO: 7.8 %
NEUTROPHILS ABSOLUTE: 4 THOU/MM3 (ref 1.8–7.7)
NEUTROPHILS NFR BLD AUTO: 73.1 %
NRBC BLD AUTO-RTO: 0 /100 WBC
PHOSPHATE SERPL-MCNC: 2.7 MG/DL (ref 2.5–4.5)
PLATELET # BLD AUTO: 179 THOU/MM3 (ref 130–400)
PMV BLD AUTO: 10.9 FL (ref 9.4–12.4)
POTASSIUM SERPL-SCNC: 3.7 MEQ/L (ref 3.5–5.2)
PROT SERPL-MCNC: 5.9 G/DL (ref 6.4–8.3)
RBC # BLD AUTO: 4.4 MILL/MM3 (ref 4.7–6.1)
SODIUM SERPL-SCNC: 139 MEQ/L (ref 135–145)
WBC # BLD AUTO: 5.5 THOU/MM3 (ref 4.8–10.8)

## 2025-03-20 PROCEDURE — 6360000002 HC RX W HCPCS: Performed by: PHYSICIAN ASSISTANT

## 2025-03-20 PROCEDURE — 2500000003 HC RX 250 WO HCPCS: Performed by: PHYSICIAN ASSISTANT

## 2025-03-20 PROCEDURE — 6370000000 HC RX 637 (ALT 250 FOR IP)

## 2025-03-20 PROCEDURE — 6370000000 HC RX 637 (ALT 250 FOR IP): Performed by: PHYSICIAN ASSISTANT

## 2025-03-20 PROCEDURE — 36415 COLL VENOUS BLD VENIPUNCTURE: CPT

## 2025-03-20 PROCEDURE — 85025 COMPLETE CBC W/AUTO DIFF WBC: CPT

## 2025-03-20 PROCEDURE — 80053 COMPREHEN METABOLIC PANEL: CPT

## 2025-03-20 PROCEDURE — 84100 ASSAY OF PHOSPHORUS: CPT

## 2025-03-20 PROCEDURE — 97530 THERAPEUTIC ACTIVITIES: CPT

## 2025-03-20 RX ORDER — LOSARTAN POTASSIUM 100 MG/1
100 TABLET ORAL DAILY
DISCHARGE
Start: 2025-03-20

## 2025-03-20 RX ADMIN — ATORVASTATIN CALCIUM 10 MG: 10 TABLET, FILM COATED ORAL at 10:22

## 2025-03-20 RX ADMIN — HEPARIN SODIUM 5000 UNITS: 5000 INJECTION INTRAVENOUS; SUBCUTANEOUS at 06:08

## 2025-03-20 RX ADMIN — CARBIDOPA AND LEVODOPA 1 TABLET: 25; 100 TABLET ORAL at 13:34

## 2025-03-20 RX ADMIN — CARBIDOPA AND LEVODOPA 1 TABLET: 25; 100 TABLET ORAL at 10:23

## 2025-03-20 RX ADMIN — HYDROCHLOROTHIAZIDE 25 MG: 25 TABLET ORAL at 10:24

## 2025-03-20 RX ADMIN — SODIUM CHLORIDE, PRESERVATIVE FREE 10 ML: 5 INJECTION INTRAVENOUS at 10:26

## 2025-03-20 RX ADMIN — HEPARIN SODIUM 5000 UNITS: 5000 INJECTION INTRAVENOUS; SUBCUTANEOUS at 13:35

## 2025-03-20 RX ADMIN — PANTOPRAZOLE SODIUM 40 MG: 40 TABLET, DELAYED RELEASE ORAL at 10:24

## 2025-03-20 RX ADMIN — SERTRALINE 50 MG: 50 TABLET, FILM COATED ORAL at 10:23

## 2025-03-20 ASSESSMENT — PAIN SCALES - GENERAL
PAINLEVEL_OUTOF10: 0

## 2025-03-20 NOTE — DISCHARGE SUMMARY
Supplement      Follow-up visits:   Demacrus Jimenez MD  [address] [place] [id number]  [phone]    Schedule an appointment as soon as possible for a visit in 1 week(s)  Hellen Watts please call to schedule a 1 week hospital follow up appointment.    Hellen Watts  [address] [place] [address]  [phone]             Discharge Medications:        Medication List        START taking these medications      losartan 100 MG tablet  Commonly known as: COZAAR  Take 1 tablet by mouth daily            CONTINUE taking these medications      atorvastatin 10 MG tablet  Commonly known as: LIPITOR  Take 1 tablet by mouth daily At bedtime     carbidopa-levodopa  MG per tablet  Commonly known as: SINEMET  Take 1 tablet by mouth 3 times daily     metoprolol succinate 50 MG extended release tablet  Commonly known as: TOPROL XL  Take 1 tablet by mouth daily     pantoprazole 40 MG tablet  Commonly known as: PROTONIX  Take 1 tablet by mouth daily     sertraline 50 MG tablet  Commonly known as: ZOLOFT  Take 1 tablet by mouth daily            STOP taking these medications      losartan-hydroCHLOROthiazide 100-25 MG per tablet  Commonly known as: HYZAAR               Where to Get Your Medications        Information about where to get these medications is not yet available    Ask your nurse or doctor about these medications  losartan 100 MG tablet         Time Spent on discharge is more than 45 minutes in the examination, evaluation, counseling and review of medications and discharge plan.  Signed:    Thank you Demarcus Jimenez MD for the opportunity to be involved in this patient's care.    Electronically signed by Alma Topete MD on 3/20/2025 at 2:41 PM

## 2025-03-20 NOTE — PROGRESS NOTES
Hospitalist Progress Note      Patient:  Timothy J Luersman      Unit/Bed:6K-08/008-A    YOB: 1960    MRN: [id number]       Acct: [id number]     PCP: Demarcus Jimenez MD    Date of Admission: 3/13/2025    Date/Time of Evaluation:  3/16/2025 at 8:52 AM    Assessment/Plan:    KRISTOPHER -- due to rhabdo, hypovolemia -- creat 3.7 on admission 3/13/25 -> aggressive IVF for rhabdo and improving to 1.0 on 3/16 -- baseline in past couple years 0.9 - 1.1 -- good u/o, renal u/s 3/13/25 normal -- stop IVF 3/16 and monitor po intake, BMP's, u/o  Traumatic Rhabdomyolysis due to mechanical fall -- CK 3702 on 3/13, urine positive for myoglobin and with KRISTOPHER started on aggressive IVF on admission --> CK improved down to 790 on 3/16/2025 -- stopped IVF 3/16 and monitor -- continue to trend CK prn, BMP, LFTs  -- PT/OT following for fall - PMR c/s apprec and ?IPR at d/c - await re-eval 3/17  -- 3/16/25 = checked B12 = WNL, folate = WNL,. Vit D extremely low < 6 thus start suppelmentwith fall;  TSH 3/13 WNL  -- CT head, CT C spine, and XR's in ER 3/13/25 (-) for fracture  Leukocytosis -- 13.9 on admission 3/13 - likely reactive to above -- improved to WNL since 3/14, afebrile and no active signs of infection -- does have some abrasions on his knees but do not appear infected, u/a (-) on admission, CXR 3/13 (-) acute process -- cont monitor prn  Hypokalemia -- due to decreased po, rhabdo -- replace per protocol and monitor -- Mg low 3/16 - replace also and monitor  Hypomagnesemia -- creat 1.5 on 3/16/2025 -> replace per protocol with IV mag and re-eval 3/17   Elevated LFTs --likely due to rhabdo -- holding statin -- improving overall but still slightly elevated AST/ALT 3/16 -- LFT 1/29/25 were WNL -- stopped IVF 3/16 -- Continue to trend, no GI symptoms at this time and will continue to monitor  Elevated troponin -- likely due to KRISTOPHER, rhabdo - ?demand ischemia - trop 53 -> 49 on admission - no hx CAD -- asx and monitor EKG 
  Hospitalist Progress Note      Patient:  Timothy J Luersman      Unit/Bed:6K-08/008-A    YOB: 1960    MRN: [id number]       Acct: [id number]     PCP: Demarcus Jimenez MD    Date of Admission: 3/13/2025    Date/Time of Evaluation:  3/15/2025 at 9:57 AM    Assessment/Plan:    KRISTOPHER -- due to rhabdo, hypovolemia -- creat 3.7 on admission 3/13/25 -> aggressive IVF for rhabdo and improving to 1.1 on 3/15 -- baseline in past couple years 0.9 - 1.1 -- good u/o, renal u/s 3/13/25 normal -- decrease IVF to 100 mL/hr as ck still elevated 3/15 but improving -- monitor BMP's, u/o  Traumatic Rhabdomyolysis due to mechanical fall -- CK 3702 on 3/13, urine positive for myoglobin and with KRISTOPHER started on aggressive IVF on admission --> CK improved down to 1704 on 3/15/2025 --will decrease IVF to 100 ml/hr his CK and creatinine improving  -- continue to trend CK, BMP, LFTs  -- PT/OT following for fall - PMR c/s apprec and ?IPR at d/c  -- check B12, folate, Vit D with fall;  TSH 3/13 WNL  -- XR's in ER 3/13/25 (-) for fracture  Leukocytosis -- 13.9 on admission 3/13 - likely reactive to above -- improved to WNL since 3/14, afebrile and no active signs of infection -- does have some abrasions on his knees but do not appear infected, u/a (-) on admission, CXR 3/13 (-) acute process -- cont monitor prn  Hypokalemia -- due to decreased po, rhabdo -- replace per protocol and monitor, Mg 3/14 normal - repeat 3/15  Elevated LFTs --likely due to rhabdo --holding statin, LFTs improving with IVF.  AST still elevated 168 on 3/15 but the rest of LFTs WNL.  Continue to trend, no GI symptoms at this time and will continue to monitor  Elevated troponin -- likely due to KRISTOPHER, rhabdo - ?demand ischemia - trop 53 -> 49 on admission - no hx CAD -- asx and monitor EKG prn  Normocytic anemia -- hgb down to 11.1 on 3/14 from 14.1 on admission -- prior has been in 14's in past couple years -- No overt signs of bleeding - ?dilutional with 
  PROGRESS NOTE      Patient:  Timothy J Luersman      Unit/Bed:6-08/008-A    YOB: 1960    MRN: [id number]       Acct: [id number]     PCP: Demarcus Jimenez MD    Date of Admission: 3/13/2025    Assessment/Plan:    Anticipated Discharge in : pending clinical course     Mechanical fall  Physical deconditioning  Pt admitted after mechanical fall at home, as he slipped and fell while cleaning at home and was unable to get himself back up after for a while.  No LOC.  No head injury.  CT Head, CT C-Spine, and XR done in ED (3/13) negative.   PT/OT following - appreciate recs  Recommend IPR upon discharge  PM&R (Dr. Ramos) c/s -- appreciate recs for D/C planning -- 3/17  recommended SNF upon discharge for continued long-term rehab -- Pre-cert (p) for [facility] started 3/18  3/16  Vit B12/Folate WNL;  Vit D <6 (extremely low) -- started supplementation (as below)    Traumatic Rhabdomyolysis, improving  Likely 2/2 mechanical fall (as above) leading to muscle injury  Pt presented after mechanical fall leading to muscle injury, pain and weakness.  Dark-colored urine noted prior to arrival.  3/13 CK elevated to 3700,  Urine Mb (+), UA (+) Blood, Cr 3.7 (elevated)  In ED, treated with NS bolus x 3L, and started on aggressive continuous IVF (LR@250 mL/h) upon admission.  3/16  CK improved to ~800 --> IVF D/C'ed and encourage PO hydration/intake  3/17  CK  improved to  400's  Trend CK prn  Monitor daily CBC, BMP, LFTs in AM.    KRISTOPHER, improved  Multifactorial, likely 2/2 above (Rhabdomyolysis) vs dehydration from reduced PO intake and volume depletion from diarrhea prior to admission  3/13  Cr 3.7 noted on admission (baseline Cr ~ 1.0)  --> treated with aggressive IVF (as noted above) (LR @ 250 cc/h) upon admission  3/14  Cr 2.2  >> 3/17  Cr 0.9 - improved to baseline  IVF D/C'ed 3/16 -- tolerating PO intake/hydration well.  Avoid nephrotoxic agents -- Home Lisinopril/HCTZ held initially during admission -- resumed 3/18, 
  PROGRESS NOTE      Patient:  Timothy J Luersman      Unit/Bed:6-08/008-A    YOB: 1960    MRN: [id number]       Acct: [id number]     PCP: Demarcus Jimenez MD    Date of Admission: 3/13/2025    Assessment/Plan:    Anticipated Discharge in : pending clinical course     Mechanical fall  Physical deconditioning  Pt admitted after mechanical fall at home, as he slipped and fell while cleaning at home and was unable to get himself back up after for a while.  No LOC.  No head injury.  CT Head, CT C-Spine, and XR done in ED (3/13) negative.  PT/OT following - appreciate recs  Recommend IPR upon discharge  PM&R (Dr. Ramos) c/s -- appreciate recs for D/C planning -- 3/17  recommended SNF upon discharge for continued long-term rehab -- Pre-cert (p) [started 3/18]  3/16  Vit B12/Folate WNL;  Vit D <6 (extremely low) -- continue supplementation (as below)    Traumatic Rhabdomyolysis, improving  Likely 2/2 mechanical fall (as above) leading to muscle injury  Pt presented after mechanical fall leading to muscle injury, pain and weakness.  Dark-colored urine noted prior to arrival.  3/13 CK elevated to 3702,  Urine Mb (+), UA (+) Blood, Cr 3.7 (elevated)  In ED, treated with NS bolus x 3L, and started on aggressive continuous IVF (LR@250 mL/h) upon admission.  3/16  CK improved to ~800 --> IVF D/C'ed and encourage PO hydration/intake -- 3/19  Pt tolerating PO intake well with good output, as well as significant improvement in symptoms noted.  3/17  CK  improved to 407  Trend CK prn  Monitor daily CBC, BMP, LFTs in AM.    KRISTOPHER, improved  Multifactorial, likely 2/2 above (Rhabdomyolysis) vs dehydration from reduced PO intake and volume depletion from diarrhea prior to admission  3/13  Cr 3.7 noted on admission (baseline Cr ~ 1.0)  --> treated with aggressive IVF (as noted above) (LR @ 250 cc/h) upon admission  3/14  Cr 2.2  >> 3/17  Cr 0.9 - improved to baseline  IVF D/C'ed 3/16 -- tolerating PO intake/hydration 
 [facility]  INPATIENT PHYSICAL THERAPY  DAILY NOTE  STRZ RENAL TELEMETRY 6K - 6K-08/008-A      Discharge Recommendations: Inpatient Therapy Stay  Equipment Recommendations: No               Time In: 1019  Time Out: 1104  Timed Code Treatment Minutes: 38 Minutes  Minutes: 45     Minute Variance  Variance: 7  Reason:  (Obtaining documentation for patient)    Date: 3/18/2025  Patient Name: Timothy J Luersman,  Gender:  male        MRN: [id number]  : 1960  (64 y.o.)     Referring Practitioner: Gurjit Saini PA-C  Diagnosis: KRISTOPHER (acute kidney injury)  Additional Pertinent Hx: Per EMR: \"Timothy J Luersman is a 64 y.o. male with a history of Parkinson's disease, hypertension, hyperlipidemia, GERD, and depression who presented to [facility] with chief complaint of fall.  The patient reports on Tuesday he had a few episodes of diarrhea.  He subsequently had some diarrhea on Wednesday.  He was attempting to clean up his floor due to the incontinence and slipped and fell to the ground.  He was unable to get up off the ground and subsequently laid on the ground for approximately 1 day.  He did not lose consciousness.  He currently reports some pain in his neck, but denies any other significant pain.  He has not had any further loose stools.  No associated abdominal pain, nausea, vomiting, fevers, or chills.  In the emergency department he was found to have an acute kidney injury and rhabdomyolysis.  The patient does report decreased oral intake over the last couple of days as well as volume depletion from the diarrhea.  He reports his urine output has been decreased and his urine has been dark-colored.  No chest pain, cough, shortness of breath.  Uses a walker to ambulate.  No smoking or alcohol\"     Prior Level of Function:  Lives With: Alone  Type of Home: House  Home Layout: One level  Home Access: Stairs to enter without rails  Entrance Stairs - Number of Steps: 3 JT at front 
 [facility]  INPATIENT PHYSICAL THERAPY  DAILY NOTE  STRZ RENAL TELEMETRY 6K - 6K-08/008-A      Discharge Recommendations: Subacute/Skilled Nursing Facility  Equipment Recommendations: No               Time In: 09  Time Out: 937  Timed Code Treatment Minutes: 31 Minutes  Minutes: 31          Date: 3/20/2025  Patient Name: Timothy J Luersman,  Gender:  male        MRN: [id number]  : 1960  (64 y.o.)     Referring Practitioner: Gurjit Saini PA-C  Diagnosis: KRISTOPHER (acute kidney injury)  Additional Pertinent Hx: Per EMR: \"Timothy J Luersman is a 64 y.o. male with a history of Parkinson's disease, hypertension, hyperlipidemia, GERD, and depression who presented to [facility] with chief complaint of fall.  The patient reports on Tuesday he had a few episodes of diarrhea.  He subsequently had some diarrhea on Wednesday.  He was attempting to clean up his floor due to the incontinence and slipped and fell to the ground.  He was unable to get up off the ground and subsequently laid on the ground for approximately 1 day.  He did not lose consciousness.  He currently reports some pain in his neck, but denies any other significant pain.  He has not had any further loose stools.  No associated abdominal pain, nausea, vomiting, fevers, or chills.  In the emergency department he was found to have an acute kidney injury and rhabdomyolysis.  The patient does report decreased oral intake over the last couple of days as well as volume depletion from the diarrhea.  He reports his urine output has been decreased and his urine has been dark-colored.  No chest pain, cough, shortness of breath.  Uses a walker to ambulate.  No smoking or alcohol\"     Prior Level of Function:  Lives With: Alone  Type of Home: House  Home Layout: One level  Home Access: Stairs to enter without rails  Entrance Stairs - Number of Steps: 3 JT at front entry way (deck); 3 JT in garage  Home Equipment: Rollator, Cane, 
[facility]  STRZ RENAL TELEMETRY 6K  Occupational Therapy  Daily Note    Discharge Recommendations: Inpatient Rehabilitation  Equipment Recommendations:   Continue to monitor needs pending discharge disposition       Time In:   Time Out: 1900  Timed Code Treatment Minutes: 45 Minutes  Minutes: 45          Date: 3/18/2025  Patient Name: Timothy J Luersman,   Gender: male      Room: [address] [place] [address]  MRN: [id number]  : 1960  (64 y.o.)  Referring Practitioner: Gurjit Saini PA-C  Diagnosis: KRISTOPHER (acute kidney injury)  Additional Pertinent Hx: Timothy J Luersman is a 64 y.o. male with a history of Parkinson's disease, hypertension, hyperlipidemia, GERD, and depression who presented to [facility] with chief complaint of fall.  The patient reports on Tuesday he had a few episodes of diarrhea.  He subsequently had some diarrhea on Wednesday.  He was attempting to clean up his floor due to the incontinence and slipped and fell to the ground.  He was unable to get up off the ground and subsequently laid on the ground for approximately 1 day.  He did not lose consciousness.  He currently reports some pain in his neck, but denies any other significant pain.  He has not had any further loose stools.  No associated abdominal pain, nausea, vomiting, fevers, or chills.  In the emergency department he was found to have an acute kidney injury and rhabdomyolysis.  The patient does report decreased oral intake over the last couple of days as well as volume depletion from the diarrhea.  He reports his urine output has been decreased and his urine has been dark-colored.  No chest pain, cough, shortness of breath.  Uses a walker to ambulate.  No smoking or alcohol.    Restrictions/Precautions:  Restrictions/Precautions: Fall Risk, General Precautions  Implants Present? : Deep brain stimulator  Position Activity Restriction  Other Position/Activity Restrictions: Hx/o Parkinson's  
[facility]  STRZ RENAL TELEMETRY 6K  Occupational Therapy  Daily Note    Discharge Recommendations: Subacute/skilled nursing facility  Equipment Recommendations:   Continue to monitor needs pending discharge disposition      Time In: 1331  Time Out: 1354  Timed Code Treatment Minutes: 23 Minutes  Minutes: 23          Date: 3/19/2025  Patient Name: Timothy J Luersman,   Gender: male      Room: [address] [place] [address]  MRN: [id number]  : 1960  (64 y.o.)  Referring Practitioner: Gurjit Saini PA-C  Diagnosis: KRISTOPHER (acute kidney injury)  Additional Pertinent Hx: Timothy J Luersman is a 64 y.o. male with a history of Parkinson's disease, hypertension, hyperlipidemia, GERD, and depression who presented to [facility] with chief complaint of fall.  The patient reports on Tuesday he had a few episodes of diarrhea.  He subsequently had some diarrhea on Wednesday.  He was attempting to clean up his floor due to the incontinence and slipped and fell to the ground.  He was unable to get up off the ground and subsequently laid on the ground for approximately 1 day.  He did not lose consciousness.  He currently reports some pain in his neck, but denies any other significant pain.  He has not had any further loose stools.  No associated abdominal pain, nausea, vomiting, fevers, or chills.  In the emergency department he was found to have an acute kidney injury and rhabdomyolysis.  The patient does report decreased oral intake over the last couple of days as well as volume depletion from the diarrhea.  He reports his urine output has been decreased and his urine has been dark-colored.  No chest pain, cough, shortness of breath.  Uses a walker to ambulate.  No smoking or alcohol.    Restrictions/Precautions:  Restrictions/Precautions: Fall Risk, General Precautions  Implants Present? : Deep brain stimulator  Position Activity Restriction  Other Position/Activity Restrictions: Hx/o Parkinson's 
Comprehensive Nutrition Assessment    Type and Reason for Visit: Initial, Positive nutrition screen (MST Score 4)    Nutrition Recommendations/Plan:   Continue diet as ordered.   Continue Magic Cups TID and Anurag BID.   Recommend MVI to aid in wound healing.      Malnutrition Assessment:  Malnutrition Status: At risk for malnutrition  Context: Chronic Illness       Findings of the 6 clinical characteristics of malnutrition:  Energy Intake:  Mild decrease in energy intake  Weight Loss:  Mild weight loss (3.8% (9 lb) in the last 2 months which is not significant)     Body Fat Loss:        Muscle Mass Loss:  Mild muscle mass loss Temples (temporalis)  Fluid Accumulation:  No fluid accumulation     Strength:  Not Performed    Nutrition Assessment:    Pt. nutritionally compromised AEB wounds. At risk for further nutrition compromise r/t increased nutrient needs for wound healing, admitted s/p fall with diarrhea, KRISTOPHER- resolving. Noted underlying medical condition (PMHx skin cancer, GERD, HLD, HTN, Parkinson's disease with tremors).     Nutrition Related Findings:    Pt. Report/Treatments/Miscellaneous: Patient seen, sitting up in chair with family member at bedside. Per RD observation he ate 100% of his breakfast. He reports that his appetite has been pretty good. He usually eats 2-3 meals per day. He has meals on wheels delivered weekly which provides his lunches for the week. Patient reports that he likes the chocolate Mighty Shakes and has not tried the Anurag yet. RD got water and mixed the Anurag for patient to trial- encouraged continued use at discharge.   GI Status: Last BM 3/12  Wound: Stage II (buttock wound)   Edema: none, per flowsheet   Pertinent Labs:   No results found for: \"GLUF\", \"LABA1C\"  Recent Labs     03/15/25  0710 03/16/25  0807 03/17/25  0516    143 141   K 3.3* 3.5 4.0    106 106   GLUCOSE 102 95 98   BUN 31* 20 20   CREATININE 1.1 1.0 0.9   MG  --  1.5* 1.6   PHOS  --   --  2.2* 
Dr Ramos recommending SNF, IPR will sign off.  
IPR following, discussed patient with Dr Ramos who will see patient later today and make determination.  
Patient received sacramental anointing by a . If you are in need of  support, please call [phone]. If you are in need of a  after 6:30pm, please call the house supervisor for the on-call .     03/14/25 1733   Encounter Summary   Encounter Overview/Reason  Encounter   Service Provided For Patient and family together   Referral/Consult From Family   Support System Family members   Last Encounter  03/14/25  (Anointed.)   Complexity of Encounter Moderate   Begin Time 1430   End Time  1500   Total Time Calculated 30 min   Spiritual/Emotional needs   Type Spiritual Support   Rituals, Rites and Sacraments   Type Anointing   Assessment/Intervention/Outcome   Assessment Calm   Intervention Active listening;Empowerment   Outcome Encouraged;Engaged in conversation;Acceptance       
Report called to Kylah at [facility], all questions answered-pt discharge instructions printed and reviewed, sent with pt including last dose MAR and SUZETTE. Pt IV removed, telemetry off, cleaned up and changed into new depends. Pt escorted via ambulance in good condition.  
Tele Arie'd VO Dr Aguilar  
[facility]  INPATIENT REHABILITATION  ADMISSIONS COORDINATOR CONSULT    Referral Type: internal    Patient Name: Timothy J Luersman      MRN: [id number]    : 1960  (64 y.o.)  Gender: male   Race:White (non-)     Payor Source: Payor: [employer] / Plan: [employer] CHOICE HMO / Product Type: *No Product type* /   Secondary Payor Source:      Isolation Status: No active isolations    Lives With: Alone  Type of Home: House  Home Layout: One level  Home Access: Stairs to enter without rails  Entrance Stairs - Number of Steps: 3 JT at front entry way (deck); 3 JT in garage  Receives Help From: Family (Brother and nephew assist with showers)     Additional Comments: Pt reports (I) with mobility using 4WW when needed but was not ambulating with device within home environment. Additionally reports goos family support from his brother and nieces/nephews.    What is treatment plan?  Disciplines Required upon Admission to Inpatient Rehabilitation: Physical Therapy, Occupational Therapy, and Speech Therapy  Post operative: No  Fall: Yes  Dialysis: No  Diet: ADULT DIET; Regular  Discussed patient with  and PM&R provider: Await medical work up completion including completed therapy evaluations and PM&R consult note to further determine patient needs.    Patient requires precert for post acute care.   UPDATE: 11:45 Met with patient and family member at bedside.  Introduced self and Rehab philosophy, criteria for admission and insurance precert.  Patient states he is interested in pursuing admission her to [facility] IPR.   Did updated PM&R physicians of the visit with patient.  Await recommendations from PM&R provider.         
mobility training and neuro re-ed. Continue per plan of care.   Activity Tolerance:  Patient tolerance of  treatment:Good.  Plan: Current Treatment Recommendations: Strengthening, Balance training, Functional mobility training, Transfer training, Endurance training, Safety education & training, Home exercise program, Neuromuscular re-education, Patient/Caregiver education & training, Therapeutic activities  General Plan:  (5x GM/N)    Education:  Learners: Patient  Patient Education: Plan of Care, Bed Mobility, Transfers    Goals:  Patient Goals : \"I want to be able to walk without a fear of falling.\"  Short Term Goals  Time Frame for Short Term Goals: Prior to hospital d/c  Short Term Goal 1: Pt will perform supine <> sitting EOB with use of bed railing and modA to get in and out of bed  Short Term Goal 2: Pt will perform rolling L <> R with use of bed railing and Crissy to reposition in bed  Short Term Goal 3: Pt will perform STSs to RW from various surface heights with Crissy to prepare for ambulation and transfers  Short Term Goal 4: Pt will perform pregait interventions while in standing to progress functional mobility  Short Term Goal 5: PT to assess ambulation when appropriate  Additional Goals?: Yes  Short Term Goal 6: Pt will perform bed <> chair transfers with Crissy to transfer to various surfaces       Following session, patient left seated in bedside chair with chair alarm activated. Call light and bedside table are within reach. Student RN is present.     
x3-5 minutes with CGA to improve (I) with eventual sinkside grooming tasks.  Short Term Goal 3: Pt will demonstrate functional mobility using RW with CGA and 0-2 cues for safety to improve (I) with ADL access.  Short Term Goal 4: Pt will complete LB ADLs with Alex using adaptive strategies or AE as needed to increase (I) with ADLs.  Short Term Goal 5: Pt will complete BUE HEP with 90% accurracy to improve overall activity tolerance within daily routines.  Long Term Goals  Time Frame for Long Term Goals : none d/t ELOS.    Following session, patient left in safe position with all fall risk precautions in place.       
abilities r/t Parkinson Disease dx. Expressive and receptive language functioning grossly intact, with demonstrated ability to communicate within complex conversation appropriately throughout entire evaluation. Speech intelligibility is WFL, approximating 100% within conversation; No dysarthria. Mild alteration in normal vocal quality functioning with endorsement for increased vocal hoarseness. Highly suspect current cognitive functioning is at baseline level; Patient likely to make successful return to cognitive demands in home and community environments with supportive family. No further skilled ST services indicated at this level of care; Please re-consult ST should further needs develop.     Rehabilitation Potential: excellent    EDUCATION:  Learner: Patient and Family  Education:  Reviewed results and recommendations of this evaluation, Reviewed diet and strategies, and Reviewed ST goals and Plan of Care  Evaluation of Education: Verbalizes understanding    PLAN:  No further speech therapy services indicated.        Mercedes Rojas M.S., CCC-SLP [id number]      
parkinson's symptoms during mobility including marches for breaking of freezing episodes and use of visual cueing for increased step lengths.   Eduction also provided on recommendation the therapy sta following admission including benefits of IPR.   Pt and family deny further questions or concerns.     Plan:  Current Treatment Recommendations: Strengthening, Balance training, Functional mobility training, Transfer training, Endurance training, Safety education & training, Home exercise program, Neuromuscular re-education, Patient/Caregiver education & training, Therapeutic activities  General Plan:  (5x GM/N)    Goals:  Patient Goals : \"I want to be able to walk without a fear of falling.\"  Short Term Goals  Time Frame for Short Term Goals: Prior to hospital d/c  Short Term Goal 1: Pt will perform supine <> sitting EOB with use of bed railing and modA to get in and out of bed  Short Term Goal 2: Pt will perform rolling L <> R with use of bed railing and Crissy to reposition in bed  Short Term Goal 3: Pt will perform STSs to RW from various surface heights with Crissy to prepare for ambulation and transfers  Short Term Goal 4: Pt will perform pregait interventions while in standing to progress functional mobility  Short Term Goal 5: PT to assess ambulation when appropriate  Additional Goals?: Yes  Short Term Goal 6: Pt will perform bed <> chair transfers with Crissy to transfer to various surfaces       Following session, patient left seated in bedside chair with chair alarm activated. Call light and bedside table are within reach. Sister is present.          
standing x3-5 minutes with CGA to improve (I) with eventual sinkside grooming tasks.  Short Term Goal 3: Pt will demonstrate functional mobility using RW with CGA and 0-2 cues for safety to improve (I) with ADL access.  Short Term Goal 4: Pt will complete LB ADLs with Alex using adaptive strategies or AE as needed to increase (I) with ADLs.  Short Term Goal 5: Pt will complete BUE HEP with 90% accurracy to improve overall activity tolerance within daily routines.  Long Term Goals  Time Frame for Long Term Goals : none d/t ELOS.    AM-PAC Inpatient Daily Activity Raw Score: 13  AM-PAC Inpatient ADL T-Scale Score : 32.03    Following session, patient left in safe position with all fall risk precautions in place.  
            [x] SQ Heparin                                 [] Encourage ambulation           [] Already on Anticoagulation     Disposition:    [] Home       [] TCU       [] Rehab       [] Psych       [] SNF       [] Long Term Care Facility       [x] Other- (p) recs -- ?IPR - PM&R c/s     Code Status: Full Code    PT/OT Eval Status: following -- appreciate recs      Electronically signed by Alma Topete MD on 3/14/2025 at 3:44 PM    This note was electronically signed. Parts of this note may have been dictated by use of voice recognition software and electronically transcribed. The note may contain errors not detected in proofreading. Please refer to my supervising physician's documentation if my documentation differs.      
errors which are inherent in voice recognition technology.**            Electronically signed by Dr. Roberto Carlos Blair          Diet: ADULT DIET; Regular  ADULT ORAL NUTRITION SUPPLEMENT; Breakfast, Lunch, Dinner; Standard 4 oz Oral Supplement  ADULT ORAL NUTRITION SUPPLEMENT; Breakfast, Dinner; Wound Healing Oral Supplement    DVT prophylaxis: [] Lovenox                                 [] SCDs                                 [x] SQ Heparin                                 [] Encourage ambulation           [] Already on Anticoagulation     Disposition:    [] Home       [] TCU       [] Rehab       [] Psych       [] SNF       [] Long Term Care Facility       [x] Other- (p) PM&R recs -- ?IPR vs SNF (Pre-cert per SW)    Code Status: Full Code    PT/OT Eval Status: following -- appreciate recs      Electronically signed by Alma Topete MD on 3/17/2025 at 10:27 AM    This note was electronically signed. Parts of this note may have been dictated by use of voice recognition software and electronically transcribed. The note may contain errors not detected in proofreading. Please refer to my supervising physician's documentation if my documentation differs.      
141 135 - 145 meq/L    Potassium 4.0 3.5 - 5.2 meq/L    Chloride 106 98 - 111 meq/L    CO2 25 22 - 29 meq/L    Calcium 8.3 (L) 8.8 - 10.2 mg/dL    AST 91 (H) 10 - 50 U/L    Alkaline Phosphatase 87 40 - 129 U/L    Total Protein 5.5 (L) 6.4 - 8.3 g/dL    Albumin 3.1 (L) 3.4 - 4.9 g/dL    Total Bilirubin 0.4 0.3 - 1.2 mg/dL    ALT 26 10 - 50 U/L   Anion Gap    Collection Time: 03/17/25  5:16 AM   Result Value Ref Range    Anion Gap 10.0 8.0 - 16.0 meq/L   Glomerular Filtration Rate, Estimated    Collection Time: 03/17/25  5:16 AM   Result Value Ref Range    Est, Glom Filt Rate > 90 >60 ml/min/1.73m2       Impression:  Rhabdomyolysis  KRISTOPHER  Parkinson's disease  Leukocytosis  Elevated troponin  Acute normocytic anemia  Hypokalemia  HTN  HLD  GERD  Depression  Mild cognitive impairment    Recommendations:  Functional Impairments: cognition, balance, bed mobility, transfers, ambulation, ADLS   Patient would benefit from ongoing therapies to address impairments listed above.  Discussed the various levels of inpatient rehab including acute inpatient rehab (here at [facility]) vs. Subacute inpatient rehab (SNF). Discussed differences in intensity and LOS between the various levels.   Patient with limited progress with therapies thus far. Based on his significant functional impairments and the level of support that is available at home, it is likely that the patient will require more than the short window of time allotted in the acute inpatient rehab setting to achieve functional goals necessary to return home. Therefore patient would be best suited for subacute rehab to allow for more time for healing and functional progress.   This was discussed with patient at bedside today. He is understanding and agreeable t oSNF. Top choice is [facility].     It was my pleasure to evaluate [facility] today.  Please call with questions.      Nickie Ramos, DO

## 2025-03-20 NOTE — CARE COORDINATION
3/17/25, 2:36 PM EDT    DISCHARGE ON GOING EVALUATION    [facility] day: 4  Location: -08/008-A Reason for admit: Acute kidney injury [N17.9]  Fall, initial encounter [W19.XXXA]  Traumatic rhabdomyolysis, initial encounter [T79.6XXA]  Acute renal failure, unspecified acute renal failure type [N17.9]     Procedures: none    Imaging since last note: none     Barriers to Discharge: Hospitalist and PM&R following. PT/OT. Dietician. Await PM&R decision on IPR admission. K 4.0. Phos 2.2. Total  (790)     PCP: Demarcus Jimenez MD  Readmission Risk Score: 14.3    Patient Goals/Plan/Treatment Preferences: From home alone. IPR vs SNF. PM&R still reviewing. Will require precert.    
3/18/25, 10:44 AM EDT    DISCHARGE PLANNING EVALUATION       CONOR notified that Patient does not qualify for IPR at this time. Maximo referral provided to [facility] and they are not in-network with Patient's insurance.  CONOR reviewed in-network list with Patient and he prefers SW to speak with his brother Pillo.  SW reached out to Pillo and he will be in to see Patient and determine SNF in about an hour and will contact SW back at that time.     CONOR spoke with brother Pillo and they prefer referral to [facility].     CONOR provided referral and Tracie from [facility] and they are able to accept and will initiate pre-cert today.      
3/18/25, 2:00 PM EDT    DISCHARGE ON GOING EVALUATION    [facility] day: 5  Location: -08/008-A Reason for admit: Acute kidney injury [N17.9]  Fall, initial encounter [W19.XXXA]  Traumatic rhabdomyolysis, initial encounter [T79.6XXA]  Acute renal failure, unspecified acute renal failure type [N17.9]     Procedures: none    Imaging since last note: none    Barriers to Discharge: Hospitalist and PM&R following. PT/OT. Dietician. Medically stable; Awaiting placement/precert.     PCP: Demarcus Jimenez MD  Readmission Risk Score: 14    Patient Goals/Plan/Treatment Preferences: IPR denied. Planning [facility]. Precert to start today. SW following.     
3/20/25, 9:07 AM EDT    Patient goals/plan/ treatment preferences discussed by  and .  Patient goals/plan/ treatment preferences reviewed with patient/ family.  Patient/ family verbalize understanding of discharge plan and are in agreement with goal/plan/treatment preferences.  Understanding was demonstrated using the teach back method.  AVS provided by RN at time of discharge, which includes all necessary medical information pertaining to the patients current course of illness, treatment, post-discharge goals of care, and treatment preferences.     Services At/After Discharge: Skilled Nursing Facility (SNF), Aide services, In ambulance, Nursing service, OT, and PT    CONOR received call from Tracie with [facility], they have insurance approval.    CONOR notified pt and brother, Spencer.    JANETTEP for  at 2pm.  Tracie at [facility] is aware.    CONOR faxed AVS.  RN is aware         
DISCHARGE PLANNING EVALUATION  3/17/25, 11:19 AM EDT    Reason for Referral: discharge needs  Decision Maker: Patient is currently making decisions, ACP docs on file  Current Services: none family assistance  New Services Requested: IPR vs SNF for rehab  Family/ Social/ Home environment: Patient is from home where he has been mostly independent using a cane/walker.  Patient has had a history of falls at home and brother was assisting with ADL's recently.  Patient prefers IPR first and [facility] second.   Payment Source:Medical Suring  Transportation at Discharge: family  Post-acute (PAC) provider list was provided to patient. Patient was informed of their freedom to choose PAC provider. Discussed and offered to show the patient the relevant PAC Providers quality and resource use measures on Medicare Compare web site via computer based on patient's goals of care and treatment preferences. Questions regarding selection process were answered.      Teach Back Method used with Patient regarding care plan and discharge plan.  Patient and family verbalized understanding of the plan of care and contribute to goal setting.       Patient preferences and discharge plan: Patient prefers IPR and second [facility].     Electronically signed by JIMENEZ Interiano, LSW on 3/17/2025 at 11:19 AM          
  Family able to assist with home care needs: Yes   Would you like for me to discuss the discharge plan with any other family members/significant others, and if so, who? Yes  (Family)   Financial Resources Medicare   Community Resources None   Social/Functional History   Lives With Alone   Type of Home House   Home Layout One level   Active  No   Patient's  Info Family   Discharge Planning   Type of Residence House   Living Arrangements Alone   Current Services Prior To Admission Durable Medical Equipment   Current DME Prior to Arrival Cane;Shower Chair;Walker   Potential Assistance Needed Other (Comment)  (IPR.)   Potential Assistance Purchasing Medications No   Patient expects to be discharged to: Unknown   Services At/After Discharge   Transition of Care Consult (CM Consult) Discharge Planning   Mode of Transport at Discharge Other (see comment)  (Family)   Confirm Follow Up Transport Family   Condition of Participation: Discharge Planning   The Plan for Transition of Care is related to the following treatment goals: Get stronger and more steady.

## 2025-03-20 NOTE — FLOWSHEET NOTE
Patient A&O x 3, speech is clear. Oral mucus membrane pink and moist. Pupils round, equal and reactive. Lungs clear throughout. Heart sounds normal. Skin turgor less than/equal to 3 seconds; capillary refill less than/equal to 3 seconds. Handgrasp weak. Arm drift positive.Denies numbness/tingling. Abdomen rounded/non tender. Bowel sounds present in all four quadrants. No pain reported on palpation. Lower extremities dry and warm. Pedal push/pull weak. Pedal pulses weak. Patient is a two person assist. Wound present to buttocks and primary RN Bola is aware. Patient has call light within reach; bed alarm on. 0/10 pain.

## 2025-03-20 NOTE — PLAN OF CARE
Problem: Discharge Planning  Goal: Discharge to home or other facility with appropriate resources  3/17/2025 1123 by Laverne Gómez, MSW, LSW  Outcome: Progressing  Flowsheets (Taken 3/17/2025 1123)  Discharge to home or other facility with appropriate resources: Identify barriers to discharge with patient and caregiver  Note: New IPR or [facility] for rehab, see SW notes 3/17/25.    
  Problem: Safety - Adult  Goal: Free from fall injury  3/15/2025 0915 by Sharmila Nunez RN  Outcome: Progressing  3/14/2025 2316 by Evelyn Coffey RN  Outcome: Progressing     Problem: Discharge Planning  Goal: Discharge to home or other facility with appropriate resources  3/15/2025 0915 by Sharmila Nunez RN  Outcome: Progressing  3/14/2025 2316 by Evelyn Coffey RN  Outcome: Progressing     Problem: ABCDS Injury Assessment  Goal: Absence of physical injury  3/15/2025 0915 by Sharmila Nunez RN  Outcome: Progressing  3/14/2025 2316 by Evelyn Coffey RN  Outcome: Progressing     Problem: Skin/Tissue Integrity  Goal: Skin integrity remains intact  Description: 1.  Monitor for areas of redness and/or skin breakdown  2.  Assess vascular access sites hourly  3.  Every 4-6 hours minimum:  Change oxygen saturation probe site  4.  Every 4-6 hours:  If on nasal continuous positive airway pressure, respiratory therapy assess nares and determine need for appliance change or resting period  3/15/2025 0915 by Sharmila Nunez RN  Outcome: Progressing  3/14/2025 2316 by Evelyn Coffey RN  Outcome: Progressing     
  Problem: Safety - Adult  Goal: Free from fall injury  3/15/2025 2141 by Evelyn Coffey RN  Outcome: Progressing  3/15/2025 0915 by Sharmila Nunez RN  Outcome: Progressing     Problem: Discharge Planning  Goal: Discharge to home or other facility with appropriate resources  3/15/2025 2141 by Evelyn Coffey RN  Outcome: Progressing  Flowsheets (Taken 3/15/2025 1933)  Discharge to home or other facility with appropriate resources:   Identify barriers to discharge with patient and caregiver   Arrange for needed discharge resources and transportation as appropriate   Identify discharge learning needs (meds, wound care, etc)  3/15/2025 0915 by Sharmila Nunez RN  Outcome: Progressing     Problem: ABCDS Injury Assessment  Goal: Absence of physical injury  3/15/2025 2141 by Evelyn Coffey RN  Outcome: Progressing  3/15/2025 0915 by Sharmila Nunez RN  Outcome: Progressing     Problem: Skin/Tissue Integrity  Goal: Skin integrity remains intact  Description: 1.  Monitor for areas of redness and/or skin breakdown  2.  Assess vascular access sites hourly  3.  Every 4-6 hours minimum:  Change oxygen saturation probe site  4.  Every 4-6 hours:  If on nasal continuous positive airway pressure, respiratory therapy assess nares and determine need for appliance change or resting period  3/15/2025 2141 by Evelyn Coffey RN  Outcome: Progressing  Flowsheets  Taken 3/15/2025 1933 by Evelyn Coffey RN  Skin Integrity Remains Intact:   Monitor for areas of redness and/or skin breakdown   Assess vascular access sites hourly  Taken 3/15/2025 0917 by Sharmila Nunez RN  Skin Integrity Remains Intact: Monitor for areas of redness and/or skin breakdown  3/15/2025 0915 by Sharmila Nunez RN  Outcome: Progressing     Problem: Pain  Goal: Verbalizes/displays adequate comfort level or baseline comfort level  Outcome: Progressing     
  Problem: Safety - Adult  Goal: Free from fall injury  3/17/2025 0131 by Magdalena Vallecillo, RN  Outcome: Progressing     Problem: Skin/Tissue Integrity  Goal: Skin integrity remains intact  Outcome: Progressing     Problem: Pain  Goal: Verbalizes/displays adequate comfort level or baseline comfort level  3/17/2025 0131 by Magdalena Vallecillo, RN  Outcome: Progressing     
  Problem: Safety - Adult  Goal: Free from fall injury  3/17/2025 0940 by Sunitha Gómez, RN  Outcome: Progressing  Flowsheets (Taken 3/17/2025 0940)  Free From Fall Injury:   Instruct family/caregiver on patient safety   Based on caregiver fall risk screen, instruct family/caregiver to ask for assistance with transferring infant if caregiver noted to have fall risk factors  Note: No falls noted this shift. Continue falling star program. Bed alarm on, bed in low position. Call light and personal belongings in reach.  Patient uses call light appropriately.      Problem: Discharge Planning  Goal: Discharge to home or other facility with appropriate resources  Outcome: Progressing  Flowsheets (Taken 3/17/2025 0940)  Discharge to home or other facility with appropriate resources:   Identify barriers to discharge with patient and caregiver   Identify discharge learning needs (meds, wound care, etc)     Problem: ABCDS Injury Assessment  Goal: Absence of physical injury  Outcome: Progressing  Flowsheets (Taken 3/17/2025 0940)  Absence of Physical Injury: Implement safety measures based on patient assessment     Problem: Skin/Tissue Integrity  Goal: Skin integrity remains intact  Description: 1.  Monitor for areas of redness and/or skin breakdown  2.  Assess vascular access sites hourly  3.  Every 4-6 hours minimum:  Change oxygen saturation probe site  4.  Every 4-6 hours:  If on nasal continuous positive airway pressure, respiratory therapy assess nares and determine need for appliance change or resting period  3/17/2025 0940 by Sunitha Gómez, RN  Outcome: Progressing  Flowsheets (Taken 3/17/2025 0940)  Skin Integrity Remains Intact:   Monitor for areas of redness and/or skin breakdown   Assess vascular access sites hourly  Note: No new signs or symptoms of skin breakdown noted this shift, encouraging patient to turn and reposition self in bed q2h      Problem: Pain  Goal: Verbalizes/displays adequate comfort level or 
  Problem: Safety - Adult  Goal: Free from fall injury  Outcome: Progressing     Problem: Discharge Planning  Goal: Discharge to home or other facility with appropriate resources  Outcome: Progressing     Problem: ABCDS Injury Assessment  Goal: Absence of physical injury  Outcome: Progressing     Problem: Skin/Tissue Integrity  Goal: Skin integrity remains intact  Description: 1.  Monitor for areas of redness and/or skin breakdown  2.  Assess vascular access sites hourly  3.  Every 4-6 hours minimum:  Change oxygen saturation probe site  4.  Every 4-6 hours:  If on nasal continuous positive airway pressure, respiratory therapy assess nares and determine need for appliance change or resting period  Outcome: Progressing     
  Problem: Safety - Adult  Goal: Free from fall injury  Outcome: Progressing     Problem: Discharge Planning  Goal: Discharge to home or other facility with appropriate resources  Outcome: Progressing     Problem: ABCDS Injury Assessment  Goal: Absence of physical injury  Outcome: Progressing     Problem: Skin/Tissue Integrity  Goal: Skin integrity remains intact  Description: 1.  Monitor for areas of redness and/or skin breakdown  2.  Assess vascular access sites hourly  3.  Every 4-6 hours minimum:  Change oxygen saturation probe site  4.  Every 4-6 hours:  If on nasal continuous positive airway pressure, respiratory therapy assess nares and determine need for appliance change or resting period  Outcome: Progressing     Problem: Pain  Goal: Verbalizes/displays adequate comfort level or baseline comfort level  Outcome: Progressing     Problem: Nutrition Deficit:  Goal: Optimize nutritional status  Outcome: Progressing     
  Problem: Safety - Adult  Goal: Free from fall injury  Outcome: Progressing     Problem: Discharge Planning  Goal: Discharge to home or other facility with appropriate resources  Outcome: Progressing  Flowsheets (Taken 3/13/2025 2106)  Discharge to home or other facility with appropriate resources:   Identify barriers to discharge with patient and caregiver   Arrange for needed discharge resources and transportation as appropriate     Problem: ABCDS Injury Assessment  Goal: Absence of physical injury  Outcome: Progressing     Problem: Skin/Tissue Integrity  Goal: Skin integrity remains intact  Description: 1.  Monitor for areas of redness and/or skin breakdown  2.  Assess vascular access sites hourly  3.  Every 4-6 hours minimum:  Change oxygen saturation probe site  4.  Every 4-6 hours:  If on nasal continuous positive airway pressure, respiratory therapy assess nares and determine need for appliance change or resting period  Outcome: Progressing     
  Problem: Pain  Goal: Verbalizes/displays adequate comfort level or baseline comfort level  3/18/2025 1057 by Sunitha Gómez RN  Outcome: Progressing  Flowsheets (Taken 3/18/2025 1057)  Verbalizes/displays adequate comfort level or baseline comfort level:   Encourage patient to monitor pain and request assistance   Administer analgesics based on type and severity of pain and evaluate response  Note: No complaint of pain voiced at this time.  Continue to monitor. PRN medications available if needed.      Problem: Nutrition Deficit:  Goal: Optimize nutritional status  3/18/2025 1057 by Sunitha óGmez, RN  Outcome: Progressing  Flowsheets (Taken 3/18/2025 1057)  Nutrient intake appropriate for improving, restoring, or maintaining nutritional needs: Assess nutritional status and recommend course of action   Care plan reviewed with patient. Patient verbalizes understanding of plan of care and contributes to goal setting.   
1222)  Verbalizes/displays adequate comfort level or baseline comfort level:   Encourage patient to monitor pain and request assistance   Administer analgesics based on type and severity of pain and evaluate response  Note: No complaint of pain voiced at this time.  Continue to monitor. PRN medications available if needed.    Care plan reviewed with patient. Patient verbalizes understanding of plan of care and contributes to goal setting.   
integrity remains intact  Description: 1.  Monitor for areas of redness and/or skin breakdown  2.  Assess vascular access sites hourly  3.  Every 4-6 hours minimum:  Change oxygen saturation probe site  4.  Every 4-6 hours:  If on nasal continuous positive airway pressure, respiratory therapy assess nares and determine need for appliance change or resting period  3/17/2025 2255 by Evelyn Coffey RN  Outcome: Progressing  Flowsheets (Taken 3/17/2025 2007)  Skin Integrity Remains Intact:   Monitor for areas of redness and/or skin breakdown   Assess vascular access sites hourly  3/17/2025 0940 by Sunitha Gómez RN  Outcome: Progressing  Flowsheets (Taken 3/17/2025 0940)  Skin Integrity Remains Intact:   Monitor for areas of redness and/or skin breakdown   Assess vascular access sites hourly  Note: No new signs or symptoms of skin breakdown noted this shift, encouraging patient to turn and reposition self in bed q2h      Problem: Pain  Goal: Verbalizes/displays adequate comfort level or baseline comfort level  3/17/2025 2255 by Evelyn Coffey RN  Outcome: Progressing  Flowsheets (Taken 3/17/2025 2007)  Verbalizes/displays adequate comfort level or baseline comfort level: Encourage patient to monitor pain and request assistance  3/17/2025 0940 by Sunitha Gómez RN  Outcome: Progressing  Flowsheets (Taken 3/17/2025 0940)  Verbalizes/displays adequate comfort level or baseline comfort level:   Encourage patient to monitor pain and request assistance   Administer analgesics based on type and severity of pain and evaluate response  Note: No complaint of pain voiced at this time.  Continue to monitor. PRN medications available if needed.      Problem: Nutrition Deficit:  Goal: Optimize nutritional status  Outcome: Progressing

## 2025-03-24 LAB
BUN / CREAT RATIO: 25 (ref 7–25)
BUN BLDV-MCNC: 25 MG/DL (ref 3–29)
CALCIUM SERPL-MCNC: 8.7 MG/DL (ref 8.5–10.5)
CHLORIDE BLD-SCNC: 106 MEQ/L (ref 96–110)
CO2: 26 MEQ/L (ref 19–32)
CREAT SERPL-MCNC: 1 MG/DL (ref 0.5–1.2)
ESTIMATED GLOMERULAR FILTRATION RATE CREATININE EQUATION: 83 MLS/MIN/1.73M2
FASTING STATUS: NORMAL
GLUCOSE BLD-MCNC: 88 MG/DL (ref 70–99)
HCT VFR BLD CALC: 35.3 % (ref 37.5–51)
HEMOGLOBIN: 11.8 G/DL (ref 13–17.7)
MCH RBC QN AUTO: 28 PG (ref 26–34)
MCHC RBC AUTO-ENTMCNC: 33.4 G/DL (ref 30.7–35.5)
MCV RBC AUTO: 83.6 FL (ref 80–100)
PDW BLD-RTO: 13.8 %
PLATELET # BLD: 221 K/UL (ref 140–400)
PMV BLD AUTO: 11.3 FL (ref 7.2–11.7)
POTASSIUM SERPL-SCNC: 3.6 MEQ/L (ref 3.4–5.3)
RBC # BLD: 4.22 M/UL (ref 4.14–5.8)
SODIUM BLD-SCNC: 144 MEQ/L (ref 135–148)
WBC # BLD: 7.2 K/UL (ref 3.5–10.9)

## 2025-04-14 PROBLEM — R79.89 ELEVATED TROPONIN: Status: RESOLVED | Noted: 2025-03-15 | Resolved: 2025-04-14
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
## 2025-04-22 LAB
BUN / CREAT RATIO: 25 (ref 7–25)
BUN BLDV-MCNC: 20 MG/DL (ref 3–29)
CALCIUM SERPL-MCNC: 9.1 MG/DL (ref 8.5–10.5)
CHLORIDE BLD-SCNC: 104 MEQ/L (ref 96–110)
CO2: 28 MEQ/L (ref 19–32)
CREAT SERPL-MCNC: 0.8 MG/DL (ref 0.5–1.2)
ESTIMATED GLOMERULAR FILTRATION RATE CREATININE EQUATION: 98 MLS/MIN/1.73M2
FASTING STATUS: NORMAL
GLUCOSE BLD-MCNC: 80 MG/DL (ref 70–99)
HCT VFR BLD CALC: 38.1 % (ref 37.5–51)
HEMOGLOBIN: 12.6 G/DL (ref 13–17.7)
MCH RBC QN AUTO: 27.8 PG (ref 26–34)
MCHC RBC AUTO-ENTMCNC: 33.1 G/DL (ref 30.7–35.5)
MCV RBC AUTO: 84.1 FL (ref 80–100)
PDW BLD-RTO: 14.2 %
PLATELET # BLD: 189 K/UL (ref 140–400)
PMV BLD AUTO: 11.3 FL (ref 7.2–11.7)
POTASSIUM SERPL-SCNC: 3.6 MEQ/L (ref 3.4–5.3)
RBC # BLD: 4.53 M/UL (ref 4.14–5.8)
SODIUM BLD-SCNC: 142 MEQ/L (ref 135–148)
WBC # BLD: 6.6 K/UL (ref 3.5–10.9)

## (undated) DEVICE — GAUZE,SPONGE,4"X4",12PLY,STERILE,LF,2'S: Brand: MEDLINE

## (undated) DEVICE — GOWN,SIRUS,NON REINFRCD,LARGE,SET IN SL: Brand: MEDLINE

## (undated) DEVICE — GLOVE ORANGE PI 7   MSG9070

## (undated) DEVICE — GLOVE SURG SZ 8 L11.77IN FNGR THK9.8MIL STRW LTX POLYMER

## (undated) DEVICE — ADHESIVE SKIN CLOSURE FLX 0.5 CC FOR TISS SEAL HISTOACRYL

## (undated) DEVICE — PACK PROCEDURE SURG PLAS SC MIN SRHP LF

## (undated) DEVICE — Z INACTIVE USE 2854097 SPONGE GZ W4XL4IN COT 12 PLY TYP VII WVN C FLD DSGN

## (undated) DEVICE — COTTON BALL ST